# Patient Record
Sex: MALE | Race: WHITE | NOT HISPANIC OR LATINO | Employment: FULL TIME | URBAN - METROPOLITAN AREA
[De-identification: names, ages, dates, MRNs, and addresses within clinical notes are randomized per-mention and may not be internally consistent; named-entity substitution may affect disease eponyms.]

---

## 2023-03-27 LAB
ALANINE AMINOTRANSFERASE (SGPT) (U/L) IN SER/PLAS: 14 U/L (ref 10–52)
ALBUMIN (G/DL) IN SER/PLAS: 4.1 G/DL (ref 3.4–5)
ALKALINE PHOSPHATASE (U/L) IN SER/PLAS: 54 U/L (ref 33–136)
ANION GAP IN SER/PLAS: 13 MMOL/L (ref 10–20)
ASPARTATE AMINOTRANSFERASE (SGOT) (U/L) IN SER/PLAS: 17 U/L (ref 9–39)
BASOPHILS (10*3/UL) IN BLOOD BY AUTOMATED COUNT: 0.07 X10E9/L (ref 0–0.1)
BASOPHILS/100 LEUKOCYTES IN BLOOD BY AUTOMATED COUNT: 1 % (ref 0–2)
BILIRUBIN TOTAL (MG/DL) IN SER/PLAS: 0.7 MG/DL (ref 0–1.2)
CALCIUM (MG/DL) IN SER/PLAS: 9.1 MG/DL (ref 8.6–10.3)
CARBON DIOXIDE, TOTAL (MMOL/L) IN SER/PLAS: 24 MMOL/L (ref 21–32)
CHLORIDE (MMOL/L) IN SER/PLAS: 105 MMOL/L (ref 98–107)
CHOLESTEROL (MG/DL) IN SER/PLAS: 197 MG/DL (ref 0–199)
CHOLESTEROL IN HDL (MG/DL) IN SER/PLAS: 46.1 MG/DL
CHOLESTEROL/HDL RATIO: 4.3
CREATININE (MG/DL) IN SER/PLAS: 0.84 MG/DL (ref 0.5–1.3)
EOSINOPHILS (10*3/UL) IN BLOOD BY AUTOMATED COUNT: 0.7 X10E9/L (ref 0–0.7)
EOSINOPHILS/100 LEUKOCYTES IN BLOOD BY AUTOMATED COUNT: 10.3 % (ref 0–6)
ERYTHROCYTE DISTRIBUTION WIDTH (RATIO) BY AUTOMATED COUNT: 13.2 % (ref 11.5–14.5)
ERYTHROCYTE MEAN CORPUSCULAR HEMOGLOBIN CONCENTRATION (G/DL) BY AUTOMATED: 33.3 G/DL (ref 32–36)
ERYTHROCYTE MEAN CORPUSCULAR VOLUME (FL) BY AUTOMATED COUNT: 90 FL (ref 80–100)
ERYTHROCYTES (10*6/UL) IN BLOOD BY AUTOMATED COUNT: 5.24 X10E12/L (ref 4.5–5.9)
GFR MALE: >90 ML/MIN/1.73M2
GLUCOSE (MG/DL) IN SER/PLAS: 79 MG/DL (ref 74–99)
HEMATOCRIT (%) IN BLOOD BY AUTOMATED COUNT: 47.2 % (ref 41–52)
HEMOGLOBIN (G/DL) IN BLOOD: 15.7 G/DL (ref 13.5–17.5)
IMMATURE GRANULOCYTES/100 LEUKOCYTES IN BLOOD BY AUTOMATED COUNT: 0.1 % (ref 0–0.9)
LDL: 118 MG/DL (ref 0–99)
LEUKOCYTES (10*3/UL) IN BLOOD BY AUTOMATED COUNT: 6.8 X10E9/L (ref 4.4–11.3)
LYMPHOCYTES (10*3/UL) IN BLOOD BY AUTOMATED COUNT: 2.11 X10E9/L (ref 1.2–4.8)
LYMPHOCYTES/100 LEUKOCYTES IN BLOOD BY AUTOMATED COUNT: 30.9 % (ref 13–44)
MONOCYTES (10*3/UL) IN BLOOD BY AUTOMATED COUNT: 0.66 X10E9/L (ref 0.1–1)
MONOCYTES/100 LEUKOCYTES IN BLOOD BY AUTOMATED COUNT: 9.7 % (ref 2–10)
NEUTROPHILS (10*3/UL) IN BLOOD BY AUTOMATED COUNT: 3.27 X10E9/L (ref 1.2–7.7)
NEUTROPHILS/100 LEUKOCYTES IN BLOOD BY AUTOMATED COUNT: 48 % (ref 40–80)
PLATELETS (10*3/UL) IN BLOOD AUTOMATED COUNT: 232 X10E9/L (ref 150–450)
POTASSIUM (MMOL/L) IN SER/PLAS: 4.2 MMOL/L (ref 3.5–5.3)
PROSTATE SPECIFIC AG (NG/ML) IN SER/PLAS: 4.68 NG/ML (ref 0–4)
PROTEIN TOTAL: 6.8 G/DL (ref 6.4–8.2)
SODIUM (MMOL/L) IN SER/PLAS: 138 MMOL/L (ref 136–145)
TRIGLYCERIDE (MG/DL) IN SER/PLAS: 165 MG/DL (ref 0–149)
UREA NITROGEN (MG/DL) IN SER/PLAS: 18 MG/DL (ref 6–23)
VLDL: 33 MG/DL (ref 0–40)

## 2023-04-28 RX ORDER — GABAPENTIN 400 MG/1
1 CAPSULE ORAL 2 TIMES DAILY
COMMUNITY
Start: 2021-03-17 | End: 2023-05-01 | Stop reason: SDUPTHER

## 2023-04-28 RX ORDER — MELOXICAM 15 MG/1
1 TABLET ORAL DAILY
COMMUNITY
Start: 2020-06-09 | End: 2023-05-01 | Stop reason: SDUPTHER

## 2023-04-28 RX ORDER — VERAPAMIL HYDROCHLORIDE 180 MG/1
1 TABLET, FILM COATED, EXTENDED RELEASE ORAL DAILY
COMMUNITY
Start: 2020-03-25 | End: 2023-05-01 | Stop reason: SDUPTHER

## 2023-04-28 RX ORDER — ESOMEPRAZOLE MAGNESIUM 40 MG/1
40 CAPSULE, DELAYED RELEASE ORAL 2 TIMES DAILY
COMMUNITY
Start: 2022-07-06

## 2023-04-28 RX ORDER — PANCRELIPASE 36000; 180000; 114000 [USP'U]/1; [USP'U]/1; [USP'U]/1
2 CAPSULE, DELAYED RELEASE PELLETS ORAL
COMMUNITY
Start: 2021-08-27 | End: 2023-05-01 | Stop reason: SDUPTHER

## 2023-05-01 ENCOUNTER — OFFICE VISIT (OUTPATIENT)
Dept: PRIMARY CARE | Facility: CLINIC | Age: 63
End: 2023-05-01
Payer: COMMERCIAL

## 2023-05-01 VITALS
TEMPERATURE: 97.5 F | WEIGHT: 306.2 LBS | BODY MASS INDEX: 40.58 KG/M2 | HEART RATE: 78 BPM | HEIGHT: 73 IN | DIASTOLIC BLOOD PRESSURE: 68 MMHG | SYSTOLIC BLOOD PRESSURE: 128 MMHG | OXYGEN SATURATION: 93 %

## 2023-05-01 DIAGNOSIS — I10 ESSENTIAL HYPERTENSION: Primary | ICD-10-CM

## 2023-05-01 DIAGNOSIS — F41.1 GENERALIZED ANXIETY DISORDER: ICD-10-CM

## 2023-05-01 DIAGNOSIS — R97.20 PSA ELEVATION: ICD-10-CM

## 2023-05-01 DIAGNOSIS — G62.89 OTHER POLYNEUROPATHY: ICD-10-CM

## 2023-05-01 DIAGNOSIS — E66.01 CLASS 3 SEVERE OBESITY DUE TO EXCESS CALORIES WITHOUT SERIOUS COMORBIDITY WITH BODY MASS INDEX (BMI) OF 40.0 TO 44.9 IN ADULT (MULTI): ICD-10-CM

## 2023-05-01 DIAGNOSIS — K86.89 SECONDARY PANCREATIC INSUFFICIENCY (HHS-HCC): ICD-10-CM

## 2023-05-01 DIAGNOSIS — K21.9 CHRONIC GERD: ICD-10-CM

## 2023-05-01 DIAGNOSIS — M19.90 ARTHRITIS: ICD-10-CM

## 2023-05-01 PROBLEM — G89.29 BACK PAIN, CHRONIC: Status: ACTIVE | Noted: 2023-05-01

## 2023-05-01 PROBLEM — M10.9 GOUT: Status: ACTIVE | Noted: 2023-05-01

## 2023-05-01 PROBLEM — K57.30 DIVERTICULOSIS OF COLON: Status: ACTIVE | Noted: 2023-05-01

## 2023-05-01 PROBLEM — Z86.0100 HISTORY OF COLON POLYPS: Status: ACTIVE | Noted: 2023-05-01

## 2023-05-01 PROBLEM — G62.9 PERIPHERAL NEUROPATHY: Status: ACTIVE | Noted: 2023-05-01

## 2023-05-01 PROBLEM — E66.813 CLASS 3 SEVERE OBESITY DUE TO EXCESS CALORIES WITHOUT SERIOUS COMORBIDITY WITH BODY MASS INDEX (BMI) OF 40.0 TO 44.9 IN ADULT: Status: ACTIVE | Noted: 2023-05-01

## 2023-05-01 PROBLEM — Z86.010 HISTORY OF COLON POLYPS: Status: ACTIVE | Noted: 2023-05-01

## 2023-05-01 PROBLEM — M54.9 BACK PAIN, CHRONIC: Status: ACTIVE | Noted: 2023-05-01

## 2023-05-01 PROCEDURE — 3078F DIAST BP <80 MM HG: CPT | Performed by: INTERNAL MEDICINE

## 2023-05-01 PROCEDURE — 3074F SYST BP LT 130 MM HG: CPT | Performed by: INTERNAL MEDICINE

## 2023-05-01 PROCEDURE — 99214 OFFICE O/P EST MOD 30 MIN: CPT | Performed by: INTERNAL MEDICINE

## 2023-05-01 PROCEDURE — 3008F BODY MASS INDEX DOCD: CPT | Performed by: INTERNAL MEDICINE

## 2023-05-01 PROCEDURE — 1036F TOBACCO NON-USER: CPT | Performed by: INTERNAL MEDICINE

## 2023-05-01 RX ORDER — PANCRELIPASE 36000; 180000; 114000 [USP'U]/1; [USP'U]/1; [USP'U]/1
2 CAPSULE, DELAYED RELEASE PELLETS ORAL
Qty: 540 CAPSULE | Refills: 0 | Status: SHIPPED | OUTPATIENT
Start: 2023-05-01 | End: 2023-07-25 | Stop reason: SDUPTHER

## 2023-05-01 RX ORDER — GABAPENTIN 400 MG/1
400 CAPSULE ORAL 2 TIMES DAILY
Qty: 90 CAPSULE | Refills: 0 | Status: SHIPPED | OUTPATIENT
Start: 2023-05-01 | End: 2023-07-06 | Stop reason: SDUPTHER

## 2023-05-01 RX ORDER — MELOXICAM 15 MG/1
15 TABLET ORAL DAILY
Qty: 90 TABLET | Refills: 0 | Status: SHIPPED | OUTPATIENT
Start: 2023-05-01 | End: 2023-07-25 | Stop reason: SDUPTHER

## 2023-05-01 RX ORDER — VERAPAMIL HYDROCHLORIDE 180 MG/1
180 TABLET, FILM COATED, EXTENDED RELEASE ORAL DAILY
Qty: 90 TABLET | Refills: 0 | Status: SHIPPED | OUTPATIENT
Start: 2023-05-01 | End: 2023-07-25 | Stop reason: SDUPTHER

## 2023-05-01 ASSESSMENT — ENCOUNTER SYMPTOMS
ALLERGIC/IMMUNOLOGIC NEGATIVE: 1
NEUROLOGICAL NEGATIVE: 1
ENDOCRINE NEGATIVE: 1
CARDIOVASCULAR NEGATIVE: 1
GASTROINTESTINAL NEGATIVE: 1
PSYCHIATRIC NEGATIVE: 1
RESPIRATORY NEGATIVE: 1
MUSCULOSKELETAL NEGATIVE: 1
EYES NEGATIVE: 1
HEMATOLOGIC/LYMPHATIC NEGATIVE: 1
CONSTITUTIONAL NEGATIVE: 1

## 2023-05-01 NOTE — PROGRESS NOTES
"Subjective   Patient ID: Steven Burks is a 62 y.o. male who presents for 3 month follow up.  Patient presents in follow up regarding hypertension.  Blood pressure has been well controlled on current therapy.  No adverse effects of medication reported.  Patient denies chest pain, palpitations, shortness of breath, orthopnea, fatigue or edema.    Pancreatic insufficiency Sx are well compensated on current therapy without side effects other than he complains about the cost of the therapy.    He is currently not taking the medications for his anxiety due to side effects and states he has felt better since stopping these as well as stopping the use of cannabis.  He states that he is retiring from work and this has relieved much of his stress and Sx.        Review of Systems   Constitutional: Negative.    HENT: Negative.     Eyes: Negative.    Respiratory: Negative.     Cardiovascular: Negative.    Gastrointestinal: Negative.    Endocrine: Negative.    Genitourinary: Negative.    Musculoskeletal: Negative.    Skin: Negative.    Allergic/Immunologic: Negative.    Neurological: Negative.    Hematological: Negative.    Psychiatric/Behavioral: Negative.         Objective     Blood pressure 128/68, pulse 78, temperature 36.4 °C (97.5 °F), temperature source Temporal, height 1.854 m (6' 1\"), weight 139 kg (306 lb 3.2 oz), SpO2 93 %.   Physical Exam  Constitutional:       Appearance: Normal appearance.   Neck:      Vascular: No carotid bruit.   Cardiovascular:      Rate and Rhythm: Normal rate and regular rhythm.      Pulses: Normal pulses.      Heart sounds: Normal heart sounds. No murmur heard.  Pulmonary:      Effort: Pulmonary effort is normal.      Breath sounds: Normal breath sounds. No wheezing or rales.   Abdominal:      General: Abdomen is flat. There is no distension.      Palpations: Abdomen is soft.      Tenderness: There is no abdominal tenderness.   Musculoskeletal:         General: Normal range of motion.      " "Cervical back: Normal range of motion and neck supple.   Skin:     General: Skin is warm and dry.   Neurological:      General: No focal deficit present.      Mental Status: He is alert and oriented to person, place, and time.   Psychiatric:         Mood and Affect: Mood normal.         Behavior: Behavior normal.         Assessment/Plan   Problem List Items Addressed This Visit          Nervous    Peripheral neuropathy       Circulatory    Essential hypertension - Primary       Digestive    Chronic GERD    Secondary pancreatic insufficiency       Other    Generalized anxiety disorder     BP well controlled on current therapy.  Will continue present therapy and follow.  Pancreatic insufficiency Sx well compensated on current therapy.  Will continue present therapy and follow.  Arthritis Sx overall well compensated on current therapy though he states he requires additional ibuprofen especially since he stopped cannabis use.  Will continue present therapy and follow.   Neuropathy Sx overall well compensated on current therapy.  Will continue present therapy and follow.  He does state that he feels he should qualify for disability due to his arthritis, neuropathy and anxiety.  Will try to have a work evaluation done through PT dept.  He states that with his upcoming halfway, his anxiety has been much better because \"I can see an end\" to the stress that is related to his work.  PSA remains stable at only a little over 4.0.    Follow up in 3 months  Lab to be drawn 1 week prior to next office visit.        "

## 2023-05-08 ENCOUNTER — TELEPHONE (OUTPATIENT)
Dept: PRIMARY CARE | Facility: CLINIC | Age: 63
End: 2023-05-08
Payer: COMMERCIAL

## 2023-07-06 ENCOUNTER — TELEPHONE (OUTPATIENT)
Dept: PRIMARY CARE | Facility: CLINIC | Age: 63
End: 2023-07-06
Payer: COMMERCIAL

## 2023-07-06 DIAGNOSIS — G62.89 OTHER POLYNEUROPATHY: ICD-10-CM

## 2023-07-06 RX ORDER — GABAPENTIN 400 MG/1
400 CAPSULE ORAL 2 TIMES DAILY
Qty: 60 CAPSULE | Refills: 0 | Status: SHIPPED | OUTPATIENT
Start: 2023-07-06 | End: 2023-07-25 | Stop reason: SDUPTHER

## 2023-07-06 NOTE — TELEPHONE ENCOUNTER
Rx Refill Request Telephone Encounter    Name:  Steven Burks  :  [id number]  Medication Name:  Gabepentin   Dose : 300 mg   Route : oral  Frequency : twice a day  180    Specific Pharmacy location:  [address]  Date of last appointment:  May 1  Date of next appointment:    Best number to reach patient:  [phone]

## 2023-07-24 DIAGNOSIS — K86.89 SECONDARY PANCREATIC INSUFFICIENCY (HHS-HCC): ICD-10-CM

## 2023-07-24 DIAGNOSIS — M19.90 ARTHRITIS: ICD-10-CM

## 2023-07-24 DIAGNOSIS — G62.89 OTHER POLYNEUROPATHY: ICD-10-CM

## 2023-07-24 DIAGNOSIS — I10 ESSENTIAL HYPERTENSION: ICD-10-CM

## 2023-07-24 NOTE — TELEPHONE ENCOUNTER
The patient stated that you are correct the insurance company did not dictate the schedule. The patient stated that he wants to be seen every 6 months and is asking for a refill of his medications.          The patient stated if you are not able to see him every 6 months that he would need to find a new provider and he also went on and stated that you work for him.

## 2023-07-24 NOTE — TELEPHONE ENCOUNTER
This is a patient of Dr. Thompson.    The patient has pushed his appointment out until October because he was told by his insurance company that he only needs to be seen every 6 months.  The patient was scheduled for 07/31/2023 and he is asking for refills on all of his medications.          The patient new appointment is scheduled for 10/02/2023 and his last appointment was on  05/01/2023.

## 2023-07-25 RX ORDER — GABAPENTIN 400 MG/1
400 CAPSULE ORAL 2 TIMES DAILY
Qty: 60 CAPSULE | Refills: 0 | Status: SHIPPED | OUTPATIENT
Start: 2023-07-25 | End: 2023-08-29 | Stop reason: SDUPTHER

## 2023-07-25 RX ORDER — VERAPAMIL HYDROCHLORIDE 180 MG/1
180 TABLET, FILM COATED, EXTENDED RELEASE ORAL DAILY
Qty: 30 TABLET | Refills: 0 | Status: SHIPPED | OUTPATIENT
Start: 2023-07-25 | End: 2023-08-29 | Stop reason: SDUPTHER

## 2023-07-25 RX ORDER — PANCRELIPASE 36000; 180000; 114000 [USP'U]/1; [USP'U]/1; [USP'U]/1
2 CAPSULE, DELAYED RELEASE PELLETS ORAL
Qty: 180 CAPSULE | Refills: 0 | Status: SHIPPED | OUTPATIENT
Start: 2023-07-25 | End: 2023-08-29 | Stop reason: SDUPTHER

## 2023-07-25 RX ORDER — MELOXICAM 15 MG/1
15 TABLET ORAL DAILY
Qty: 30 TABLET | Refills: 0 | Status: SHIPPED | OUTPATIENT
Start: 2023-07-25 | End: 2023-08-29 | Stop reason: SDUPTHER

## 2023-07-31 ENCOUNTER — APPOINTMENT (OUTPATIENT)
Dept: PRIMARY CARE | Facility: CLINIC | Age: 63
End: 2023-07-31
Payer: COMMERCIAL

## 2023-08-29 ENCOUNTER — OFFICE VISIT (OUTPATIENT)
Dept: PRIMARY CARE | Facility: CLINIC | Age: 63
End: 2023-08-29
Payer: COMMERCIAL

## 2023-08-29 VITALS
OXYGEN SATURATION: 96 % | DIASTOLIC BLOOD PRESSURE: 72 MMHG | BODY MASS INDEX: 40.43 KG/M2 | SYSTOLIC BLOOD PRESSURE: 124 MMHG | HEART RATE: 80 BPM | WEIGHT: 315 LBS | TEMPERATURE: 97.4 F | HEIGHT: 74 IN

## 2023-08-29 DIAGNOSIS — K86.89 SECONDARY PANCREATIC INSUFFICIENCY (HHS-HCC): ICD-10-CM

## 2023-08-29 DIAGNOSIS — G62.89 OTHER POLYNEUROPATHY: ICD-10-CM

## 2023-08-29 DIAGNOSIS — M19.90 ARTHRITIS: ICD-10-CM

## 2023-08-29 DIAGNOSIS — R97.20 PSA ELEVATION: ICD-10-CM

## 2023-08-29 DIAGNOSIS — I10 ESSENTIAL HYPERTENSION: Primary | ICD-10-CM

## 2023-08-29 DIAGNOSIS — E66.01 CLASS 3 SEVERE OBESITY DUE TO EXCESS CALORIES WITH SERIOUS COMORBIDITY AND BODY MASS INDEX (BMI) OF 40.0 TO 44.9 IN ADULT (MULTI): ICD-10-CM

## 2023-08-29 PROCEDURE — 3078F DIAST BP <80 MM HG: CPT | Performed by: INTERNAL MEDICINE

## 2023-08-29 PROCEDURE — 3008F BODY MASS INDEX DOCD: CPT | Performed by: INTERNAL MEDICINE

## 2023-08-29 PROCEDURE — 3074F SYST BP LT 130 MM HG: CPT | Performed by: INTERNAL MEDICINE

## 2023-08-29 PROCEDURE — 1036F TOBACCO NON-USER: CPT | Performed by: INTERNAL MEDICINE

## 2023-08-29 PROCEDURE — 99214 OFFICE O/P EST MOD 30 MIN: CPT | Performed by: INTERNAL MEDICINE

## 2023-08-29 RX ORDER — VERAPAMIL HYDROCHLORIDE 180 MG/1
180 TABLET, FILM COATED, EXTENDED RELEASE ORAL DAILY
Qty: 90 TABLET | Refills: 1 | Status: SHIPPED | OUTPATIENT
Start: 2023-08-29 | End: 2024-02-26 | Stop reason: SDUPTHER

## 2023-08-29 RX ORDER — MELOXICAM 15 MG/1
15 TABLET ORAL DAILY
Qty: 90 TABLET | Refills: 1 | Status: SHIPPED | OUTPATIENT
Start: 2023-08-29 | End: 2024-02-26 | Stop reason: SDUPTHER

## 2023-08-29 RX ORDER — PANCRELIPASE 36000; 180000; 114000 [USP'U]/1; [USP'U]/1; [USP'U]/1
2 CAPSULE, DELAYED RELEASE PELLETS ORAL
Qty: 540 CAPSULE | Refills: 1 | Status: SHIPPED | OUTPATIENT
Start: 2023-08-29 | End: 2024-02-26 | Stop reason: SDUPTHER

## 2023-08-29 RX ORDER — GABAPENTIN 400 MG/1
400 CAPSULE ORAL 2 TIMES DAILY
Qty: 180 CAPSULE | Refills: 1 | Status: SHIPPED | OUTPATIENT
Start: 2023-08-29 | End: 2024-02-26 | Stop reason: SDUPTHER

## 2023-08-29 RX ORDER — GABAPENTIN 400 MG/1
400 CAPSULE ORAL 2 TIMES DAILY
Qty: 180 CAPSULE | Refills: 0 | Status: CANCELLED | OUTPATIENT
Start: 2023-08-29 | End: 2023-11-27

## 2023-08-29 RX ORDER — MELOXICAM 15 MG/1
15 TABLET ORAL DAILY
Qty: 90 TABLET | Refills: 0 | Status: CANCELLED | OUTPATIENT
Start: 2023-08-29 | End: 2023-11-27

## 2023-08-29 RX ORDER — VERAPAMIL HYDROCHLORIDE 180 MG/1
180 TABLET, FILM COATED, EXTENDED RELEASE ORAL DAILY
Qty: 90 TABLET | Refills: 0 | Status: CANCELLED | OUTPATIENT
Start: 2023-08-29 | End: 2023-11-27

## 2023-08-29 RX ORDER — PANCRELIPASE 36000; 180000; 114000 [USP'U]/1; [USP'U]/1; [USP'U]/1
2 CAPSULE, DELAYED RELEASE PELLETS ORAL
Qty: 540 CAPSULE | Refills: 0 | Status: CANCELLED | OUTPATIENT
Start: 2023-08-29 | End: 2023-11-27

## 2023-08-29 ASSESSMENT — ENCOUNTER SYMPTOMS
ENDOCRINE NEGATIVE: 1
CONSTITUTIONAL NEGATIVE: 1
HEMATOLOGIC/LYMPHATIC NEGATIVE: 1
NEUROLOGICAL NEGATIVE: 1
ALLERGIC/IMMUNOLOGIC NEGATIVE: 1
PSYCHIATRIC NEGATIVE: 1
MUSCULOSKELETAL NEGATIVE: 1
EYES NEGATIVE: 1
GASTROINTESTINAL NEGATIVE: 1
CARDIOVASCULAR NEGATIVE: 1
RESPIRATORY NEGATIVE: 1

## 2023-08-29 NOTE — PROGRESS NOTES
"Subjective   Patient ID: Steven Burks is a 63 y.o. male who presents for 3 month follow up .  Patient presents in follow up regarding hypertension.  Blood pressure has been well controlled on current therapy.  No adverse effects of medication reported.  Patient denies chest pain, palpitations, shortness of breath, orthopnea, fatigue or edema.    Pancreatic insufficiency Sx are well compensated on current therapy without side effects other than he complains about the cost of the therapy.    He is currently not taking the medications for his anxiety due to side effects and states he has felt better since stopping these as well as stopping the use of cannabis.  He states that he is retiring from work and this has relieved much of his stress and Sx.        Review of Systems   Constitutional: Negative.    HENT: Negative.     Eyes: Negative.    Respiratory: Negative.     Cardiovascular: Negative.    Gastrointestinal: Negative.    Endocrine: Negative.    Genitourinary: Negative.    Musculoskeletal: Negative.    Skin: Negative.    Allergic/Immunologic: Negative.    Neurological: Negative.    Hematological: Negative.    Psychiatric/Behavioral: Negative.         Objective     Blood pressure 124/72, pulse 80, temperature 36.3 °C (97.4 °F), temperature source Temporal, height 1.867 m (6' 1.5\"), weight (!) 155 kg (341 lb 12.8 oz), SpO2 96 %.   Physical Exam  Constitutional:       Appearance: Normal appearance.   Neck:      Vascular: No carotid bruit.   Cardiovascular:      Rate and Rhythm: Normal rate and regular rhythm.      Pulses: Normal pulses.      Heart sounds: Normal heart sounds. No murmur heard.  Pulmonary:      Effort: Pulmonary effort is normal.      Breath sounds: Normal breath sounds. No wheezing or rales.   Abdominal:      General: Abdomen is flat. There is no distension.      Palpations: Abdomen is soft.      Tenderness: There is no abdominal tenderness.   Musculoskeletal:         General: Normal range of motion. "      Cervical back: Normal range of motion and neck supple.   Skin:     General: Skin is warm and dry.   Neurological:      General: No focal deficit present.      Mental Status: He is alert and oriented to person, place, and time.   Psychiatric:         Mood and Affect: Mood normal.         Behavior: Behavior normal.         Assessment/Plan   Problem List Items Addressed This Visit       Arthritis    Relevant Medications    meloxicam (Mobic) 15 mg tablet    Essential hypertension - Primary    Relevant Medications    verapamil SR (Calan-SR) 180 mg ER tablet    Other Relevant Orders    Comprehensive Metabolic Panel    CBC and Auto Differential    Peripheral neuropathy    Relevant Medications    gabapentin (Neurontin) 400 mg capsule    Secondary pancreatic insufficiency    Relevant Medications    pancrelipase, Lip-Prot-Amyl, (Creon) 36,000-114,000- 180,000 unit capsule,delayed release(DR/EC) capsule    Other Relevant Orders    Comprehensive Metabolic Panel    Lipid Panel    PSA elevation    Relevant Orders    Prostate Specific Antigen     Other Visit Diagnoses       Class 3 severe obesity due to excess calories with serious comorbidity and body mass index (BMI) of 40.0 to 44.9 in adult (CMS/[facility])              BP well controlled on current therapy.  Will continue present therapy and follow.  Pancreatic insufficiency Sx well compensated on current therapy.  Will continue present therapy and follow.  Arthritis Sx overall well compensated on current therapy though he states he requires additional ibuprofen especially since he stopped cannabis use.  Will continue present therapy and follow.   Neuropathy Sx overall well compensated on current therapy.  Will continue present therapy and follow.  He does state that he feels he should qualify for disability due to his arthritis, neuropathy and anxiety.  Asked to have a work evaluation done through PT department at last visit but he did not follow through with this.  He states  that he has retired and is feeling much better overall with less stress and anxiety.  It is noted that he has gained 35# over the last 4 months.    Pt. advised on improving dietary choices and portion control as well as increasing exercise to promote weight loss.       Follow up in 3 months  Lab to be drawn 1 week prior to next office visit.

## 2023-10-02 ENCOUNTER — APPOINTMENT (OUTPATIENT)
Dept: PRIMARY CARE | Facility: CLINIC | Age: 63
End: 2023-10-02
Payer: COMMERCIAL

## 2023-12-28 ENCOUNTER — HOSPITAL ENCOUNTER (OUTPATIENT)
Dept: RADIOLOGY | Facility: HOSPITAL | Age: 63
Discharge: HOME | End: 2023-12-28
Payer: COMMERCIAL

## 2023-12-28 DIAGNOSIS — R05.9 COUGH, UNSPECIFIED: ICD-10-CM

## 2023-12-28 PROCEDURE — 71046 X-RAY EXAM CHEST 2 VIEWS: CPT | Performed by: RADIOLOGY

## 2023-12-28 PROCEDURE — 71046 X-RAY EXAM CHEST 2 VIEWS: CPT

## 2024-01-26 ENCOUNTER — TELEPHONE (OUTPATIENT)
Dept: PRIMARY CARE | Facility: CLINIC | Age: 64
End: 2024-01-26
Payer: COMMERCIAL

## 2024-01-26 ENCOUNTER — TELEPHONE (OUTPATIENT)
Dept: PRIMARY CARE | Facility: CLINIC | Age: 64
End: 2024-01-26

## 2024-01-26 NOTE — TELEPHONE ENCOUNTER
Tried calling patient to get more information about which medications he need renewed. The patient does not have voice mail

## 2024-01-30 NOTE — TELEPHONE ENCOUNTER
Henrik,  Patient needs the Creon ordered.     [address]    Trying to push it through but it is not covered. They have sent us a prior auth

## 2024-02-19 ENCOUNTER — LAB (OUTPATIENT)
Dept: LAB | Facility: LAB | Age: 64
End: 2024-02-19
Payer: COMMERCIAL

## 2024-02-19 DIAGNOSIS — I10 ESSENTIAL HYPERTENSION: ICD-10-CM

## 2024-02-19 DIAGNOSIS — R97.20 PSA ELEVATION: ICD-10-CM

## 2024-02-19 DIAGNOSIS — K86.89 SECONDARY PANCREATIC INSUFFICIENCY (HHS-HCC): ICD-10-CM

## 2024-02-19 LAB
ALBUMIN SERPL BCP-MCNC: 3.9 G/DL (ref 3.4–5)
ALP SERPL-CCNC: 59 U/L (ref 33–136)
ALT SERPL W P-5'-P-CCNC: 20 U/L (ref 10–52)
ANION GAP SERPL CALC-SCNC: 15 MMOL/L (ref 10–20)
AST SERPL W P-5'-P-CCNC: 22 U/L (ref 9–39)
BASOPHILS # BLD AUTO: 0.09 X10*3/UL (ref 0–0.1)
BASOPHILS NFR BLD AUTO: 1.2 %
BILIRUB SERPL-MCNC: 0.6 MG/DL (ref 0–1.2)
BUN SERPL-MCNC: 17 MG/DL (ref 6–23)
CALCIUM SERPL-MCNC: 8.9 MG/DL (ref 8.6–10.3)
CHLORIDE SERPL-SCNC: 105 MMOL/L (ref 98–107)
CHOLEST SERPL-MCNC: 222 MG/DL (ref 0–199)
CHOLESTEROL/HDL RATIO: 5.3
CO2 SERPL-SCNC: 21 MMOL/L (ref 21–32)
CREAT SERPL-MCNC: 0.95 MG/DL (ref 0.5–1.3)
EGFRCR SERPLBLD CKD-EPI 2021: 90 ML/MIN/1.73M*2
EOSINOPHIL # BLD AUTO: 0.5 X10*3/UL (ref 0–0.7)
EOSINOPHIL NFR BLD AUTO: 6.5 %
ERYTHROCYTE [DISTWIDTH] IN BLOOD BY AUTOMATED COUNT: 13.3 % (ref 11.5–14.5)
GLUCOSE SERPL-MCNC: 86 MG/DL (ref 74–99)
HCT VFR BLD AUTO: 55.2 % (ref 41–52)
HDLC SERPL-MCNC: 41.7 MG/DL
HGB BLD-MCNC: 16.8 G/DL (ref 13.5–17.5)
IMM GRANULOCYTES # BLD AUTO: 0.05 X10*3/UL (ref 0–0.7)
IMM GRANULOCYTES NFR BLD AUTO: 0.6 % (ref 0–0.9)
LDLC SERPL CALC-MCNC: 139 MG/DL
LYMPHOCYTES # BLD AUTO: 2.09 X10*3/UL (ref 1.2–4.8)
LYMPHOCYTES NFR BLD AUTO: 27.1 %
MCH RBC QN AUTO: 30.9 PG (ref 26–34)
MCHC RBC AUTO-ENTMCNC: 30.4 G/DL (ref 32–36)
MCV RBC AUTO: 102 FL (ref 80–100)
MONOCYTES # BLD AUTO: 0.69 X10*3/UL (ref 0.1–1)
MONOCYTES NFR BLD AUTO: 9 %
NEUTROPHILS # BLD AUTO: 4.28 X10*3/UL (ref 1.2–7.7)
NEUTROPHILS NFR BLD AUTO: 55.6 %
NON HDL CHOLESTEROL: 180 MG/DL (ref 0–149)
NRBC BLD-RTO: 0 /100 WBCS (ref 0–0)
PLATELET # BLD AUTO: 221 X10*3/UL (ref 150–450)
POTASSIUM SERPL-SCNC: 4.5 MMOL/L (ref 3.5–5.3)
PROT SERPL-MCNC: 6.9 G/DL (ref 6.4–8.2)
PSA SERPL-MCNC: 6.18 NG/ML
RBC # BLD AUTO: 5.43 X10*6/UL (ref 4.5–5.9)
SODIUM SERPL-SCNC: 136 MMOL/L (ref 136–145)
TRIGL SERPL-MCNC: 207 MG/DL (ref 0–149)
VLDL: 41 MG/DL (ref 0–40)
WBC # BLD AUTO: 7.7 X10*3/UL (ref 4.4–11.3)

## 2024-02-19 PROCEDURE — 80061 LIPID PANEL: CPT

## 2024-02-19 PROCEDURE — 84153 ASSAY OF PSA TOTAL: CPT

## 2024-02-19 PROCEDURE — 85025 COMPLETE CBC W/AUTO DIFF WBC: CPT

## 2024-02-19 PROCEDURE — 80053 COMPREHEN METABOLIC PANEL: CPT

## 2024-02-26 ENCOUNTER — HOSPITAL ENCOUNTER (OUTPATIENT)
Dept: RADIOLOGY | Facility: CLINIC | Age: 64
Discharge: HOME | End: 2024-02-26
Payer: COMMERCIAL

## 2024-02-26 ENCOUNTER — OFFICE VISIT (OUTPATIENT)
Dept: PRIMARY CARE | Facility: CLINIC | Age: 64
End: 2024-02-26
Payer: COMMERCIAL

## 2024-02-26 VITALS
SYSTOLIC BLOOD PRESSURE: 130 MMHG | OXYGEN SATURATION: 97 % | WEIGHT: 315 LBS | TEMPERATURE: 97 F | DIASTOLIC BLOOD PRESSURE: 80 MMHG | HEART RATE: 69 BPM | BODY MASS INDEX: 45.47 KG/M2

## 2024-02-26 DIAGNOSIS — E66.01 CLASS 3 SEVERE OBESITY DUE TO EXCESS CALORIES WITH SERIOUS COMORBIDITY AND BODY MASS INDEX (BMI) OF 45.0 TO 49.9 IN ADULT (MULTI): ICD-10-CM

## 2024-02-26 DIAGNOSIS — R97.20 PSA ELEVATION: ICD-10-CM

## 2024-02-26 DIAGNOSIS — M19.90 ARTHRITIS: ICD-10-CM

## 2024-02-26 DIAGNOSIS — G89.29 CHRONIC PAIN OF LEFT KNEE: ICD-10-CM

## 2024-02-26 DIAGNOSIS — K86.89 SECONDARY PANCREATIC INSUFFICIENCY (HHS-HCC): ICD-10-CM

## 2024-02-26 DIAGNOSIS — M25.562 CHRONIC PAIN OF LEFT KNEE: ICD-10-CM

## 2024-02-26 DIAGNOSIS — K21.9 CHRONIC GERD: ICD-10-CM

## 2024-02-26 DIAGNOSIS — I10 ESSENTIAL HYPERTENSION: Primary | ICD-10-CM

## 2024-02-26 DIAGNOSIS — G62.89 OTHER POLYNEUROPATHY: ICD-10-CM

## 2024-02-26 PROCEDURE — 3075F SYST BP GE 130 - 139MM HG: CPT | Performed by: INTERNAL MEDICINE

## 2024-02-26 PROCEDURE — 73562 X-RAY EXAM OF KNEE 3: CPT | Mod: LT

## 2024-02-26 PROCEDURE — 99214 OFFICE O/P EST MOD 30 MIN: CPT | Performed by: INTERNAL MEDICINE

## 2024-02-26 PROCEDURE — 3079F DIAST BP 80-89 MM HG: CPT | Performed by: INTERNAL MEDICINE

## 2024-02-26 PROCEDURE — 73562 X-RAY EXAM OF KNEE 3: CPT | Mod: LEFT SIDE | Performed by: RADIOLOGY

## 2024-02-26 PROCEDURE — 3008F BODY MASS INDEX DOCD: CPT | Performed by: INTERNAL MEDICINE

## 2024-02-26 PROCEDURE — 1036F TOBACCO NON-USER: CPT | Performed by: INTERNAL MEDICINE

## 2024-02-26 RX ORDER — MELOXICAM 15 MG/1
15 TABLET ORAL DAILY
Qty: 90 TABLET | Refills: 1 | Status: SHIPPED | OUTPATIENT
Start: 2024-02-26

## 2024-02-26 RX ORDER — VERAPAMIL HYDROCHLORIDE 180 MG/1
180 TABLET, FILM COATED, EXTENDED RELEASE ORAL DAILY
Qty: 90 TABLET | Refills: 1 | Status: SHIPPED | OUTPATIENT
Start: 2024-02-26

## 2024-02-26 RX ORDER — GABAPENTIN 400 MG/1
400 CAPSULE ORAL 2 TIMES DAILY
Qty: 180 CAPSULE | Refills: 1 | Status: SHIPPED | OUTPATIENT
Start: 2024-02-26

## 2024-02-26 RX ORDER — PANCRELIPASE 36000; 180000; 114000 [USP'U]/1; [USP'U]/1; [USP'U]/1
2 CAPSULE, DELAYED RELEASE PELLETS ORAL
Qty: 540 CAPSULE | Refills: 1 | Status: SHIPPED | OUTPATIENT
Start: 2024-02-26

## 2024-02-26 ASSESSMENT — ENCOUNTER SYMPTOMS
MUSCULOSKELETAL NEGATIVE: 1
ENDOCRINE NEGATIVE: 1
CARDIOVASCULAR NEGATIVE: 1
PSYCHIATRIC NEGATIVE: 1
HEMATOLOGIC/LYMPHATIC NEGATIVE: 1
CONSTITUTIONAL NEGATIVE: 1
EYES NEGATIVE: 1
RESPIRATORY NEGATIVE: 1
NEUROLOGICAL NEGATIVE: 1
ALLERGIC/IMMUNOLOGIC NEGATIVE: 1
GASTROINTESTINAL NEGATIVE: 1

## 2024-02-26 NOTE — PROGRESS NOTES
Subjective   Patient ID: Steven Burks is a 63 y.o. male who presents for 6 month follow up .  Patient presents in follow up regarding hypertension.  Blood pressure has been well controlled on current therapy.  No adverse effects of medication reported.  Patient denies chest pain, palpitations, shortness of breath, orthopnea, fatigue or edema.    Pancreatic insufficiency Sx are well compensated on current therapy without side effects other than he complains about the cost of the therapy.    He is currently not taking the medications for his anxiety due to side effects and states he has felt better since stopping these as well as stopping the use of cannabis.  He states that he is retiring from work and this has relieved much of his stress and Sx.        Review of Systems   Constitutional: Negative.    HENT: Negative.     Eyes: Negative.    Respiratory: Negative.     Cardiovascular: Negative.    Gastrointestinal: Negative.    Endocrine: Negative.    Genitourinary: Negative.    Musculoskeletal: Negative.    Skin: Negative.    Allergic/Immunologic: Negative.    Neurological: Negative.    Hematological: Negative.    Psychiatric/Behavioral: Negative.         Objective     Blood pressure 130/80, pulse 69, temperature 36.1 °C (97 °F), temperature source Temporal, weight (!) 158 kg (349 lb 6.4 oz), SpO2 97 %.   Physical Exam  Constitutional:       Appearance: Normal appearance.   Neck:      Vascular: No carotid bruit.   Cardiovascular:      Rate and Rhythm: Normal rate and regular rhythm.      Pulses: Normal pulses.      Heart sounds: Normal heart sounds. No murmur heard.  Pulmonary:      Effort: Pulmonary effort is normal.      Breath sounds: Normal breath sounds. No wheezing or rales.   Abdominal:      General: Abdomen is flat. There is no distension.      Palpations: Abdomen is soft.      Tenderness: There is no abdominal tenderness.   Musculoskeletal:         General: Normal range of motion.      Cervical back: Normal  range of motion and neck supple.   Skin:     General: Skin is warm and dry.   Neurological:      General: No focal deficit present.      Mental Status: He is alert and oriented to person, place, and time.   Psychiatric:         Mood and Affect: Mood normal.         Behavior: Behavior normal.         Assessment/Plan   Problem List Items Addressed This Visit       Arthritis    Relevant Medications    meloxicam (Mobic) 15 mg tablet    Chronic GERD    Relevant Orders    CBC and Auto Differential    Essential hypertension - Primary    Relevant Medications    verapamil SR (Calan-SR) 180 mg ER tablet    Other Relevant Orders    Comprehensive Metabolic Panel    CBC and Auto Differential    Peripheral neuropathy    Relevant Medications    gabapentin (Neurontin) 400 mg capsule    Secondary pancreatic insufficiency    Relevant Medications    pancrelipase, Lip-Prot-Amyl, (Creon) 36,000-114,000- 180,000 unit capsule,delayed release(DR/EC) capsule    Other Relevant Orders    Lipid Panel    Comprehensive Metabolic Panel    PSA elevation    Relevant Orders    Referral to Urology    Prostate Specific Antigen     Other Visit Diagnoses       Class 3 severe obesity due to excess calories with serious comorbidity and body mass index (BMI) of 45.0 to 49.9 in adult (CMS/[facility])        Chronic pain of left knee        Relevant Orders    XR knee left 3 views    Referral to Orthopaedic Surgery          BP well controlled on current therapy.  Will continue present therapy and follow.  Pancreatic insufficiency Sx well compensated on current therapy.  Will continue present therapy and follow.  Arthritis Sx overall well compensated on current therapy though he states he requires additional ibuprofen especially since he stopped cannabis use.  Will continue present therapy and follow.   GERD Sx well compensated on current therapy.  Will continue present therapy and follow clinically.   Neuropathy Sx overall well compensated on current therapy.  Will  continue present therapy and follow.  He states that he has retired and is feeling much better overall with less stress and anxiety.  It is noted that he has gained 43# over the last 9 months, though he has only gained 8 of that over the last 6 months.    Pt. advised on improving dietary choices and portion control as well as increasing exercise to promote weight loss.   He has a new complaint of left medial knee pain with throbbing.  He states this has been present for quite a while and is not improving.  It does affect ambulation, he states.  Will x-ray and refer to Ortho.  Lab review is remarkable for a rise in PSA to >6.  Will refer to Urology for eval.    > 30 minutes was spent with the patient today, the majority of which was spent on review of history and medications as well as counselling on care plan and/or coordination of care.       Follow up in 3 months  Lab to be drawn 1 week prior to next office visit.

## 2024-03-05 ENCOUNTER — OFFICE VISIT (OUTPATIENT)
Dept: ORTHOPEDIC SURGERY | Facility: CLINIC | Age: 64
End: 2024-03-05
Payer: COMMERCIAL

## 2024-03-05 VITALS — BODY MASS INDEX: 40.43 KG/M2 | WEIGHT: 315 LBS | HEIGHT: 74 IN

## 2024-03-05 DIAGNOSIS — G89.29 CHRONIC PAIN OF LEFT KNEE: ICD-10-CM

## 2024-03-05 DIAGNOSIS — M25.562 CHRONIC PAIN OF LEFT KNEE: ICD-10-CM

## 2024-03-05 DIAGNOSIS — M17.12 PRIMARY LOCALIZED OSTEOARTHRITIS OF LEFT KNEE: ICD-10-CM

## 2024-03-05 PROCEDURE — 3008F BODY MASS INDEX DOCD: CPT | Performed by: SPECIALIST

## 2024-03-05 PROCEDURE — 1036F TOBACCO NON-USER: CPT | Performed by: SPECIALIST

## 2024-03-05 PROCEDURE — 99204 OFFICE O/P NEW MOD 45 MIN: CPT | Performed by: SPECIALIST

## 2024-03-05 NOTE — PROGRESS NOTES
Left medial knee pain for about 5 months, no injury.   Pain is constant, has aching, sharp pain and burning.   States he can not straighten it most of the time.   Not taking OTC meds for pain.     No prior surgeries.     Xrays done.     EXAM:    GENERAL: A/Ox3, NAD. Appears healthy, well nourished  SKIN: no erythema, rashes, or ecchymoses     MUSCULOSKELETAL:  Laterality: Left knee Exam  - Alignment: partially correctible varus deformity  - ROM: Full with moderate pain and crepitus  - Effusion: none  - Strength: knee extension and flexion 5/5, EHL/PF/DF motor intact  - Palpation: TTP mostly along medial joint line  - Stability: Anterior/Posterior stable, varus/valgus stable. Mild pseudo valgus instability  - Gait: mild antalgic  - Hip Exam: flexion to 100+ degrees, full extension, internal/external rotation adequate, and no pain with log roll  - Special Tests: none performed    NEUROVASCULAR:  - Neurovascular Status: sensation intact to light touch distally  - Capillary refill brisk at extremities, Bilateral dorsalis pedis pulse 2+    RADIOGRAPHS moderate osteoarthritis mostly medial compartment    ASSESSMENT left knee osteoarthritis    PLAN tolerated Kenalog injection.  Gave him a home range of motion strengthening program.  Follow-up if no improvement  L Inj/Asp: L knee on 3/6/2024 1:16 PM  Indications: pain  Details: 22 G needle, anterolateral approach  Medications: 1 mL lidocaine 10 mg/mL (1 %); 40 mg triamcinolone acetonide 40 mg/mL  Outcome: tolerated well, no immediate complications  Procedure, treatment alternatives, risks and benefits explained, specific risks discussed. Consent was given by the patient. Immediately prior to procedure a time out was called to verify the correct patient, procedure, equipment, support staff and site/side marked as required. Patient was prepped and draped in the usual sterile fashion.           This note was dictated using speech recognition software and was not corrected for  spelling or grammatical errors

## 2024-03-06 PROCEDURE — 20610 DRAIN/INJ JOINT/BURSA W/O US: CPT | Performed by: SPECIALIST

## 2024-03-06 RX ORDER — TRIAMCINOLONE ACETONIDE 40 MG/ML
40 INJECTION, SUSPENSION INTRA-ARTICULAR; INTRAMUSCULAR
Status: COMPLETED | OUTPATIENT
Start: 2024-03-06 | End: 2024-03-06

## 2024-03-06 RX ORDER — LIDOCAINE HYDROCHLORIDE 10 MG/ML
1 INJECTION INFILTRATION; PERINEURAL
Status: COMPLETED | OUTPATIENT
Start: 2024-03-06 | End: 2024-03-06

## 2024-03-06 RX ADMIN — LIDOCAINE HYDROCHLORIDE 1 ML: 10 INJECTION INFILTRATION; PERINEURAL at 13:16

## 2024-03-06 RX ADMIN — TRIAMCINOLONE ACETONIDE 40 MG: 40 INJECTION, SUSPENSION INTRA-ARTICULAR; INTRAMUSCULAR at 13:16

## 2024-03-15 ENCOUNTER — APPOINTMENT (OUTPATIENT)
Dept: PHYSICAL THERAPY | Facility: HOSPITAL | Age: 64
End: 2024-03-15
Payer: COMMERCIAL

## 2024-03-22 ENCOUNTER — OFFICE VISIT (OUTPATIENT)
Dept: UROLOGY | Facility: CLINIC | Age: 64
End: 2024-03-22
Payer: COMMERCIAL

## 2024-03-22 DIAGNOSIS — N40.1 BENIGN PROSTATIC HYPERPLASIA WITH LOWER URINARY TRACT SYMPTOMS, SYMPTOM DETAILS UNSPECIFIED: Primary | ICD-10-CM

## 2024-03-22 DIAGNOSIS — N52.8 OTHER MALE ERECTILE DYSFUNCTION: ICD-10-CM

## 2024-03-22 DIAGNOSIS — R97.20 PSA ELEVATION: ICD-10-CM

## 2024-03-22 LAB
POC BILIRUBIN, URINE: NEGATIVE
POC BLOOD, URINE: NEGATIVE
POC GLUCOSE, URINE: NEGATIVE MG/DL
POC KETONES, URINE: ABNORMAL MG/DL
POC LEUKOCYTES, URINE: NEGATIVE
POC NITRITE,URINE: NEGATIVE
POC PH, URINE: 6 PH
POC PROTEIN, URINE: NEGATIVE MG/DL
POC SPECIFIC GRAVITY, URINE: 1.02
POC UROBILINOGEN, URINE: 1 EU/DL

## 2024-03-22 PROCEDURE — 81003 URINALYSIS AUTO W/O SCOPE: CPT | Performed by: UROLOGY

## 2024-03-22 PROCEDURE — 99203 OFFICE O/P NEW LOW 30 MIN: CPT | Performed by: UROLOGY

## 2024-03-22 PROCEDURE — 3008F BODY MASS INDEX DOCD: CPT | Performed by: UROLOGY

## 2024-03-22 PROCEDURE — 1036F TOBACCO NON-USER: CPT | Performed by: UROLOGY

## 2024-03-22 NOTE — PROGRESS NOTES
03/22/2024  63-year-old gentleman presents an elevated PSA, mild voiding symptoms mild ED    Patient has no nausea, no vomiting, no fever.    Exam: Circumcised, normal penis normal testes    TEZ: 1+, no nodule    We discussed mild elevated PSA, prostate biopsy versus monitoring PSA  We discussed benign prostate hypertrophy with mild voiding symptom  We discussed erectile dysfunction, medications  All the questions were answered, the patient expressed understanding and agreed to the plan.    Impression  Elevated PSA-mild  BPH  ED    Plan  Conservative management  Repeat PSA in 6 months  Appointment in 6 months  Possible prostate biopsy in the future    New Patient   Chief Complaint   Patient presents with    Elevated PSA     New patient here for elevated PSA. No family hx or personal hx of cancer. Voiding well. Nocturia 1-2x. Patient has gained 40 lbs in the last 6 months. Denies bone pain.         Physical Exam     TODAYS LAB RESULTS:    PSA 02/19/2024  6.18  PSA 03/27/2023  4.68    POC Glucose, Urine  NEGATIVE mg/dl NEGATIVE   POC Bilirubin, Urine  NEGATIVE NEGATIVE   POC Ketones, Urine  NEGATIVE mg/dl TRACE Abnormal    POC Specific Gravity, Urine  1.005 - 1.035 1.025   POC Blood, Urine  NEGATIVE NEGATIVE   POC PH, Urine  No Reference Range Established PH 6.0   POC Protein, Urine  NEGATIVE, 30 (1+) mg/dl NEGATIVE   POC Urobilinogen, Urine  0.2, 1.0 EU/DL 1.0   Poc Nitrite, Urine  NEGATIVE NEGATIVE   POC Leukocytes, Urine  NEGATIVE NEGATIVE     ASSESSMENT&PLAN:      IMPRESSIONS:     PSA   02/19/2024  6.18  03/27/2023  4.68

## 2024-05-23 ENCOUNTER — TELEPHONE (OUTPATIENT)
Dept: PRIMARY CARE | Facility: CLINIC | Age: 64
End: 2024-05-23
Payer: COMMERCIAL

## 2024-05-23 NOTE — TELEPHONE ENCOUNTER
"Patient called in and reported ear fullness and a constant ringing/\"white noise\" for the last two weeks. Patient is scheduled to see ENT but not until June. He was wanting to know if he could come in for an ear irrigation, please advise.   "

## 2024-06-04 ENCOUNTER — OFFICE VISIT (OUTPATIENT)
Dept: PRIMARY CARE | Facility: CLINIC | Age: 64
End: 2024-06-04
Payer: COMMERCIAL

## 2024-06-04 VITALS
DIASTOLIC BLOOD PRESSURE: 70 MMHG | HEART RATE: 75 BPM | OXYGEN SATURATION: 95 % | WEIGHT: 315 LBS | BODY MASS INDEX: 41.34 KG/M2 | SYSTOLIC BLOOD PRESSURE: 120 MMHG | TEMPERATURE: 97.7 F

## 2024-06-04 DIAGNOSIS — H61.23 BILATERAL IMPACTED CERUMEN: Primary | ICD-10-CM

## 2024-06-04 PROCEDURE — 3008F BODY MASS INDEX DOCD: CPT | Performed by: INTERNAL MEDICINE

## 2024-06-04 PROCEDURE — 99213 OFFICE O/P EST LOW 20 MIN: CPT | Performed by: INTERNAL MEDICINE

## 2024-06-04 PROCEDURE — 3078F DIAST BP <80 MM HG: CPT | Performed by: INTERNAL MEDICINE

## 2024-06-04 PROCEDURE — 3074F SYST BP LT 130 MM HG: CPT | Performed by: INTERNAL MEDICINE

## 2024-06-04 ASSESSMENT — ENCOUNTER SYMPTOMS
RESPIRATORY NEGATIVE: 1
CONSTITUTIONAL NEGATIVE: 1
HEMATOLOGIC/LYMPHATIC NEGATIVE: 1
GASTROINTESTINAL NEGATIVE: 1
MUSCULOSKELETAL NEGATIVE: 1
CARDIOVASCULAR NEGATIVE: 1
ENDOCRINE NEGATIVE: 1
PSYCHIATRIC NEGATIVE: 1
EYES NEGATIVE: 1
NEUROLOGICAL NEGATIVE: 1
ALLERGIC/IMMUNOLOGIC NEGATIVE: 1

## 2024-06-04 NOTE — PROGRESS NOTES
"Subjective   Patient ID: Steven Burks is a 63 y.o. male who presents for No chief complaint on file..  Patient presents today with a complaint of a \"white noise\" in his ears almost like ringing.  He is concerned that he may have an ear wax issue.  No other associated complaints or problems at this time.        Review of Systems   Constitutional: Negative.    HENT:  Positive for tinnitus.    Eyes: Negative.    Respiratory: Negative.     Cardiovascular: Negative.    Gastrointestinal: Negative.    Endocrine: Negative.    Genitourinary: Negative.    Musculoskeletal: Negative.    Skin: Negative.    Allergic/Immunologic: Negative.    Neurological: Negative.    Hematological: Negative.    Psychiatric/Behavioral: Negative.         Objective     Blood pressure 120/70, pulse 75, temperature 36.5 °C (97.7 °F), temperature source Temporal, weight 146 kg (322 lb), SpO2 95%.   Physical Exam  Constitutional:       Appearance: Normal appearance.   HENT:      Right Ear: There is impacted cerumen.      Left Ear: There is impacted cerumen.   Pulmonary:      Effort: Pulmonary effort is normal.   Musculoskeletal:      Cervical back: Normal range of motion and neck supple.   Neurological:      General: No focal deficit present.      Mental Status: He is alert and oriented to person, place, and time.   Psychiatric:         Mood and Affect: Mood normal.         Behavior: Behavior normal.         Assessment/Plan   Problem List Items Addressed This Visit    None  Visit Diagnoses       Bilateral impacted cerumen    -  Primary    Relevant Orders    Ear Cerumen Removal          The cerumen, on exam, is deep in the canals and appears hard.  I am advising that he treat with Debrox bid for 3 days before we attempt irrigation.  He states he does have ENT appt in the future.  I advised he keep this in case we are not successful at removal.    He is asked to return in 3 days for bilateral ear irrigation.     "

## 2024-06-07 ENCOUNTER — CLINICAL SUPPORT (OUTPATIENT)
Dept: PRIMARY CARE | Facility: CLINIC | Age: 64
End: 2024-06-07
Payer: COMMERCIAL

## 2024-06-07 DIAGNOSIS — H61.23 BILATERAL IMPACTED CERUMEN: ICD-10-CM

## 2024-06-07 PROCEDURE — 69209 REMOVE IMPACTED EAR WAX UNI: CPT | Performed by: INTERNAL MEDICINE

## 2024-06-07 NOTE — PROGRESS NOTES
Patient ID: Steven Burks is a 63 y.o. male.    Ear Cerumen Removal    Date/Time: 6/7/2024 7:45 AM    Performed by: Henrik Rajan CMA  Authorized by: Chris Arreguin MD    Consent:     Consent obtained:  Verbal    Consent given by:  Patient    Risks, benefits, and alternatives were discussed: yes      Risks discussed:  Pain and dizziness  Universal protocol:     Patient identity confirmed:  Verbally with patient  Procedure details:     Location:  L ear and R ear    Procedure type: irrigation      Procedure outcomes: cerumen removed    Post-procedure details:     Procedure completion:  Tolerated with difficulty  Comments:      Stopped because the left ear was hurting and dr arreguin check him

## 2024-06-26 ENCOUNTER — APPOINTMENT (OUTPATIENT)
Dept: OTOLARYNGOLOGY | Facility: CLINIC | Age: 64
End: 2024-06-26
Payer: COMMERCIAL

## 2024-06-26 VITALS — BODY MASS INDEX: 41.09 KG/M2 | WEIGHT: 315 LBS

## 2024-06-26 DIAGNOSIS — H69.93 DYSFUNCTION OF BOTH EUSTACHIAN TUBES: ICD-10-CM

## 2024-06-26 DIAGNOSIS — B36.9 FUNGAL OTITIS EXTERNA: Primary | ICD-10-CM

## 2024-06-26 DIAGNOSIS — H93.13 TINNITUS OF BOTH EARS: ICD-10-CM

## 2024-06-26 DIAGNOSIS — H62.40 FUNGAL OTITIS EXTERNA: Primary | ICD-10-CM

## 2024-06-26 DIAGNOSIS — K21.9 CHRONIC GERD: ICD-10-CM

## 2024-06-26 PROCEDURE — 3008F BODY MASS INDEX DOCD: CPT | Performed by: GENERAL PRACTICE

## 2024-06-26 PROCEDURE — 69210 REMOVE IMPACTED EAR WAX UNI: CPT | Performed by: GENERAL PRACTICE

## 2024-06-26 PROCEDURE — 1036F TOBACCO NON-USER: CPT | Performed by: GENERAL PRACTICE

## 2024-06-26 PROCEDURE — 99204 OFFICE O/P NEW MOD 45 MIN: CPT | Performed by: GENERAL PRACTICE

## 2024-06-26 RX ORDER — FLUTICASONE PROPIONATE 50 MCG
2 SPRAY, SUSPENSION (ML) NASAL DAILY
Qty: 16 G | Refills: 3 | Status: SHIPPED | OUTPATIENT
Start: 2024-06-26 | End: 2025-06-26

## 2024-06-26 RX ORDER — CLOTRIMAZOLE 1 G/ML
SOLUTION TOPICAL 2 TIMES DAILY
Qty: 7 ML | Refills: 1 | Status: SHIPPED | OUTPATIENT
Start: 2024-06-26 | End: 2024-07-03

## 2024-06-26 NOTE — PROGRESS NOTES
Otolaryngology - Head and Neck Surgery Outpatient New Patient Visit Note        Assessment/Plan   Problem List Items Addressed This Visit             ICD-10-CM       Gastrointestinal and Abdominal    Chronic GERD K21.9     Other Visit Diagnoses         Codes    Fungal otitis externa    -  Primary B36.9, H62.40    Relevant Medications    clotrimazole (Lotrimin) 1 % external solution    Dysfunction of both eustachian tubes     H69.93    Relevant Medications    fluticasone (Flonase) 50 mcg/actuation nasal spray    Other Relevant Orders    Referral to Audiology    Tinnitus of both ears     H93.13    Relevant Orders    Referral to Audiology            64yoM with b/l fungal OE after recent ear debridement.  Will treat with clotrimazole  Scarring of TM and right incudopexy, but TM mobile on the right.  Poorly mobile on the left without obvious effusion.  No acute otitis.     Will treat for OE with clotrimazole.    Will acquire follow up after inflammation calms.    Discussed controls for rhinitis and reflux to aid in ETD.            Follow up:  -plan for follow up in clinic as needed, after completion of ordered studies, and in 2-4 weeks    All of the above findings, impressions, treatment planning and follow up plans were discussed with the patient who indicated understanding.  the patient was instructed to contact or return to clinic sooner if symptoms/signs persist or worsen despite the above management.      Pasha Day MD  Otolaryngology - Head and Neck Surgery            History Of Present Illness  Steven Burks is a 64 y.o. male presenting for concerns of hearing loss and some ear fullness/discomfort.     Reports treatment with ear flushing with limited response.   Reports a history of recurrent OE and AOM, but none recently.    Notes b/l hearing loss and tinnitus.    The patient reports slow progression of the hearing loss over time.  The paitent reports a history of intermittent, waxing/waning, nonpulsatile, tonal  tinnitus which does not interfere with hearing.   The patient reports a history of significant noise exposure due to occupational exposures, industrial noise, etc.     The patient denies sudden changes in hearing.  The patient denies  ,    , vertigo, or facial weakness.    The patient denies a history of otologic surgery or trauma.  The patient denies a history of blast injury, TBI or concussion associated with hearing loss.  The patient denies a family history of significant hearing loss.  The patient reports a history of AOM as a child, but no recent significant history of ear infection.  No reported exposure to known ototoxins (chemotherapeutics, aminoglycosides, loop diurectics, high dose NSAIDs).   No reported history of radiation treatment to the head/neck.     Reports a history of chronic nasal congestion ,but no significant allergic rhinitis or sinusitis.    Reports a history of GERD, controlled with PPIs.           Past Medical History  He has a past medical history of Body mass index (BMI) 35.0-35.9, adult (03/25/2020).    Surgical History  He has a past surgical history that includes Other surgical history (12/18/2019) and CT angio abdomen pelvis w and or wo IV IV contrast (10/24/2020).     Social History  He reports that he quit smoking about 3 years ago. His smoking use included cigarettes and pipe. He started smoking about 8 years ago. He has a 2.5 pack-year smoking history. He has never used smokeless tobacco. He reports current alcohol use. He reports that he does not currently use drugs after having used the following drugs: Marijuana.    Family History  Family History   Problem Relation Name Age of Onset    Hypertension Mother      Other (respitory failure) Mother      Cirrhosis Father      Hypertension Father      Hypertension Brother      Breast cancer Neg Hx          Allergies  Patient has no known allergies.    Review of Systems  ROS: Pertinent positives as noted in HPI.    - CONSTITUTIONAL:  Does not report weight loss, fever or chills.    - HEENT:   Ear: Does not report  , vertigo,    ,  , otorrhea  Nose: Does not report  , rhinorrhea, epistaxis, decreased smell  Throat: Does not report pain, dysphagia, odynophagia  Larynx: Does not report hoarseness,  difficulty breathing, pain with speaking (odynophonia)  Neck: Does not report new masses, pain, swelling  Face: Does not report sinus pain, pressure, swelling, numbness, weakness     - RESPIRATORY: Does not report SOB or cough.    - CV: Does not report palpitations or chest pain.     - GI: Does not report abdominal pain, nausea, vomiting or diarrhea.    - : Does not report dysuria or urinary frequency.    - MSK: Does not report myalgia or joint pain.    - SKIN: Does not report rash or pruritus.    - NEUROLOGICAL: Does not report headache or syncope.    - PSYCHIATRIC: Does not report recent changes in mood. Does not report anxiety or depression.         Physical Exam:     GENERAL:   Alert & Oriented to person, place and time; Normal affect and appearance. Well developed and well nourished. Conversant & cooperative with examination.     HEAD:   Normocephalic, atraumatic. No sinus tenderness to palpation. Normal parotid bilaterally. Normal facial strength.     NEUROLOGIC:   Cranial nerves II-XII grossly intact, gait WNL. Normal mood and affect.    EYES:   Extraocular movements intact. Pupils equal, round, reactive to light and accommodation. No nystagmus, no ptosis. no scleral injection.    EAR:   Normal auricle. No discomfort or TTP with manipulation.   Handheld otoscopic exam showed normal external auditory canals bilaterally. B/l small cerumen and diffuse small fungal hyphae debris.  Debrided with suction.   Right tympanic membrane clear and mobile with scarring and incudopexy without obvious erosion.   without evidence of perforation,   or middle ear effusion.   Left tympanic membrane clear and poorly mobile with scarring but without evidence of  perforation, retraction or middle ear effusion.     NOSE:   No external deformity. No external nasal lesions, lacerations, or scars. Nasal tip symmetrical with normal nasal valves.   Nasal cavity with essentially midline septum, edematous  mucosa and turbinates. No lesions, masses, purulence or polyps.     OC/OP:   Mucous membranes moist, no masses, lesions or exudates.   Normal tongue, floor of mouth, teeth, gums, lips. Normal posterior pharyngeal wall.    Normal tonsils without erythema, exudate or obvious calculi     NECK:   No neck masses or thyroid enlargement. Trachea midline. No tenderness to palpation    LYMPHATIC:   No cervical lymphadenopathy.     RESPIRATORY:   Symmetric chest elevation & no retractions. No significant hoarseness. No increased work of breathing.    CV:   No clubbing or cyanosis. No obvious edema    Skin:   No facial rashes, vesicles or lesions.     Extremities:   No gross abnormalities      Clinic Procedure    Binocular microscopy exam  Indication: tympanic membrane(s) could not be visualized adequately with handheld otoscopy.   Location:  bilateral ears  Visualization Instrument: A microscope was used to visualize through a speculum placed in the ear canal(s) to visualize the ear canal, tympanic membranes and to assist in assessment and removal of debris.  Findings:  see physical exam documentation  Patient Status: The patient tolerated the procedure well.   Complications: There were no complications.     Information review:  External sources (notes, imaging, lab results) listed below personally reviewed to aid in medical decision making process.  -PCM note Satinder 6/7/24  -PCM note Kevin 6/4/24  -PCM note Kevin 2/26/24

## 2024-07-26 ENCOUNTER — DOCUMENTATION (OUTPATIENT)
Dept: PHYSICAL THERAPY | Facility: HOSPITAL | Age: 64
End: 2024-07-26
Payer: COMMERCIAL

## 2024-07-26 NOTE — PROGRESS NOTES
Physical Therapy    Discharge Summary    Name: Steven Burks  MRN: [id number]  : 1960  Date: 24    Discharge Summary: PT        Rehab Discharge Reason: Pt. Never attended.

## 2024-08-21 ENCOUNTER — LAB (OUTPATIENT)
Dept: LAB | Facility: LAB | Age: 64
End: 2024-08-21
Payer: COMMERCIAL

## 2024-08-21 DIAGNOSIS — K86.89 SECONDARY PANCREATIC INSUFFICIENCY (HHS-HCC): ICD-10-CM

## 2024-08-21 DIAGNOSIS — K21.9 CHRONIC GERD: ICD-10-CM

## 2024-08-21 DIAGNOSIS — I10 ESSENTIAL HYPERTENSION: ICD-10-CM

## 2024-08-21 DIAGNOSIS — R97.20 PSA ELEVATION: ICD-10-CM

## 2024-08-21 LAB
ALBUMIN SERPL BCP-MCNC: 4.1 G/DL (ref 3.4–5)
ALP SERPL-CCNC: 54 U/L (ref 33–136)
ALT SERPL W P-5'-P-CCNC: 16 U/L (ref 10–52)
ANION GAP SERPL CALC-SCNC: 11 MMOL/L (ref 10–20)
AST SERPL W P-5'-P-CCNC: 18 U/L (ref 9–39)
BASOPHILS # BLD AUTO: 0.04 X10*3/UL (ref 0–0.1)
BASOPHILS NFR BLD AUTO: 0.5 %
BILIRUB SERPL-MCNC: 0.8 MG/DL (ref 0–1.2)
BUN SERPL-MCNC: 16 MG/DL (ref 6–23)
CALCIUM SERPL-MCNC: 9 MG/DL (ref 8.6–10.3)
CHLORIDE SERPL-SCNC: 105 MMOL/L (ref 98–107)
CHOLEST SERPL-MCNC: 239 MG/DL (ref 0–199)
CHOLESTEROL/HDL RATIO: 5
CO2 SERPL-SCNC: 25 MMOL/L (ref 21–32)
CREAT SERPL-MCNC: 0.96 MG/DL (ref 0.5–1.3)
EGFRCR SERPLBLD CKD-EPI 2021: 89 ML/MIN/1.73M*2
EOSINOPHIL # BLD AUTO: 0.34 X10*3/UL (ref 0–0.7)
EOSINOPHIL NFR BLD AUTO: 4.6 %
ERYTHROCYTE [DISTWIDTH] IN BLOOD BY AUTOMATED COUNT: 13.3 % (ref 11.5–14.5)
GLUCOSE SERPL-MCNC: 83 MG/DL (ref 74–99)
HCT VFR BLD AUTO: 50 % (ref 41–52)
HDLC SERPL-MCNC: 47.5 MG/DL
HGB BLD-MCNC: 17 G/DL (ref 13.5–17.5)
IMM GRANULOCYTES # BLD AUTO: 0.06 X10*3/UL (ref 0–0.7)
IMM GRANULOCYTES NFR BLD AUTO: 0.8 % (ref 0–0.9)
LDLC SERPL CALC-MCNC: 162 MG/DL
LYMPHOCYTES # BLD AUTO: 1.82 X10*3/UL (ref 1.2–4.8)
LYMPHOCYTES NFR BLD AUTO: 24.8 %
MCH RBC QN AUTO: 32.1 PG (ref 26–34)
MCHC RBC AUTO-ENTMCNC: 34 G/DL (ref 32–36)
MCV RBC AUTO: 94 FL (ref 80–100)
MONOCYTES # BLD AUTO: 0.59 X10*3/UL (ref 0.1–1)
MONOCYTES NFR BLD AUTO: 8 %
NEUTROPHILS # BLD AUTO: 4.49 X10*3/UL (ref 1.2–7.7)
NEUTROPHILS NFR BLD AUTO: 61.3 %
NON HDL CHOLESTEROL: 192 MG/DL (ref 0–149)
NRBC BLD-RTO: 0 /100 WBCS (ref 0–0)
PLATELET # BLD AUTO: 214 X10*3/UL (ref 150–450)
POTASSIUM SERPL-SCNC: 4.3 MMOL/L (ref 3.5–5.3)
PROT SERPL-MCNC: 6.6 G/DL (ref 6.4–8.2)
PSA SERPL-MCNC: 7.52 NG/ML
RBC # BLD AUTO: 5.3 X10*6/UL (ref 4.5–5.9)
SODIUM SERPL-SCNC: 137 MMOL/L (ref 136–145)
TRIGL SERPL-MCNC: 146 MG/DL (ref 0–149)
VLDL: 29 MG/DL (ref 0–40)
WBC # BLD AUTO: 7.3 X10*3/UL (ref 4.4–11.3)

## 2024-08-21 PROCEDURE — 80053 COMPREHEN METABOLIC PANEL: CPT

## 2024-08-21 PROCEDURE — 80061 LIPID PANEL: CPT

## 2024-08-21 PROCEDURE — 84153 ASSAY OF PSA TOTAL: CPT

## 2024-08-21 PROCEDURE — 85025 COMPLETE CBC W/AUTO DIFF WBC: CPT

## 2024-08-21 PROCEDURE — 36415 COLL VENOUS BLD VENIPUNCTURE: CPT

## 2024-08-27 ENCOUNTER — APPOINTMENT (OUTPATIENT)
Dept: PRIMARY CARE | Facility: CLINIC | Age: 64
End: 2024-08-27
Payer: COMMERCIAL

## 2024-08-27 VITALS
TEMPERATURE: 97 F | SYSTOLIC BLOOD PRESSURE: 122 MMHG | DIASTOLIC BLOOD PRESSURE: 72 MMHG | WEIGHT: 315 LBS | HEART RATE: 79 BPM | OXYGEN SATURATION: 97 % | BODY MASS INDEX: 42.22 KG/M2

## 2024-08-27 DIAGNOSIS — M25.562 CHRONIC PAIN OF LEFT KNEE: ICD-10-CM

## 2024-08-27 DIAGNOSIS — M17.12 PRIMARY OSTEOARTHRITIS OF LEFT KNEE: ICD-10-CM

## 2024-08-27 DIAGNOSIS — R97.20 PSA ELEVATION: ICD-10-CM

## 2024-08-27 DIAGNOSIS — M19.90 ARTHRITIS: ICD-10-CM

## 2024-08-27 DIAGNOSIS — I10 ESSENTIAL HYPERTENSION: Primary | ICD-10-CM

## 2024-08-27 DIAGNOSIS — K86.89 SECONDARY PANCREATIC INSUFFICIENCY (HHS-HCC): ICD-10-CM

## 2024-08-27 DIAGNOSIS — K21.9 CHRONIC GERD: ICD-10-CM

## 2024-08-27 DIAGNOSIS — E66.01 CLASS 3 SEVERE OBESITY DUE TO EXCESS CALORIES WITHOUT SERIOUS COMORBIDITY WITH BODY MASS INDEX (BMI) OF 40.0 TO 44.9 IN ADULT (MULTI): ICD-10-CM

## 2024-08-27 DIAGNOSIS — E78.00 HYPERCHOLESTEROLEMIA: ICD-10-CM

## 2024-08-27 DIAGNOSIS — M48.062 SPINAL STENOSIS OF LUMBAR REGION WITH NEUROGENIC CLAUDICATION: ICD-10-CM

## 2024-08-27 DIAGNOSIS — G62.89 OTHER POLYNEUROPATHY: ICD-10-CM

## 2024-08-27 DIAGNOSIS — G89.29 CHRONIC PAIN OF LEFT KNEE: ICD-10-CM

## 2024-08-27 PROCEDURE — G2211 COMPLEX E/M VISIT ADD ON: HCPCS | Performed by: INTERNAL MEDICINE

## 2024-08-27 PROCEDURE — 3074F SYST BP LT 130 MM HG: CPT | Performed by: INTERNAL MEDICINE

## 2024-08-27 PROCEDURE — 99215 OFFICE O/P EST HI 40 MIN: CPT | Performed by: INTERNAL MEDICINE

## 2024-08-27 PROCEDURE — 3078F DIAST BP <80 MM HG: CPT | Performed by: INTERNAL MEDICINE

## 2024-08-27 RX ORDER — MELOXICAM 15 MG/1
15 TABLET ORAL DAILY
Qty: 90 TABLET | Refills: 1 | Status: SHIPPED | OUTPATIENT
Start: 2024-08-27

## 2024-08-27 RX ORDER — PANCRELIPASE 36000; 180000; 114000 [USP'U]/1; [USP'U]/1; [USP'U]/1
2 CAPSULE, DELAYED RELEASE PELLETS ORAL
Qty: 540 CAPSULE | Refills: 1 | Status: SHIPPED | OUTPATIENT
Start: 2024-08-27

## 2024-08-27 RX ORDER — VERAPAMIL HYDROCHLORIDE 180 MG/1
180 TABLET, FILM COATED, EXTENDED RELEASE ORAL DAILY
Qty: 90 TABLET | Refills: 1 | Status: SHIPPED | OUTPATIENT
Start: 2024-08-27

## 2024-08-27 RX ORDER — GABAPENTIN 400 MG/1
400 CAPSULE ORAL 2 TIMES DAILY
Qty: 180 CAPSULE | Refills: 1 | Status: SHIPPED | OUTPATIENT
Start: 2024-08-27

## 2024-08-27 ASSESSMENT — ENCOUNTER SYMPTOMS
GASTROINTESTINAL NEGATIVE: 1
CARDIOVASCULAR NEGATIVE: 1
ALLERGIC/IMMUNOLOGIC NEGATIVE: 1
MUSCULOSKELETAL NEGATIVE: 1
HEMATOLOGIC/LYMPHATIC NEGATIVE: 1
PSYCHIATRIC NEGATIVE: 1
EYES NEGATIVE: 1
NEUROLOGICAL NEGATIVE: 1
ENDOCRINE NEGATIVE: 1
RESPIRATORY NEGATIVE: 1
CONSTITUTIONAL NEGATIVE: 1

## 2024-08-27 NOTE — PROGRESS NOTES
Subjective   Patient ID: Steven Burks is a 64 y.o. male who presents for 6 month follow up .  Patient presents in follow up regarding hypertension.  Blood pressure has been well controlled on current therapy.  No adverse effects of medication reported.  Patient denies chest pain, palpitations, shortness of breath, orthopnea, fatigue or edema.    Pancreatic insufficiency Sx are well compensated on current therapy without side effects other than he complains about the cost of the therapy.    He is currently not taking the medications for his anxiety due to side effects and states he has felt better since stopping these as well as stopping the use of cannabis.  He states that he is retiring from work and this has relieved much of his stress and Sx.        Review of Systems   Constitutional: Negative.    HENT: Negative.     Eyes: Negative.    Respiratory: Negative.     Cardiovascular: Negative.    Gastrointestinal: Negative.    Endocrine: Negative.    Genitourinary: Negative.    Musculoskeletal: Negative.    Skin: Negative.    Allergic/Immunologic: Negative.    Neurological: Negative.    Hematological: Negative.    Psychiatric/Behavioral: Negative.         Objective     Blood pressure 122/72, pulse 79, temperature 36.1 °C (97 °F), temperature source Temporal, weight 149 kg (328 lb 12.8 oz), SpO2 97%.   Physical Exam  Constitutional:       Appearance: Normal appearance.   Neck:      Vascular: No carotid bruit.   Cardiovascular:      Rate and Rhythm: Normal rate and regular rhythm.      Pulses: Normal pulses.      Heart sounds: Normal heart sounds. No murmur heard.  Pulmonary:      Effort: Pulmonary effort is normal.      Breath sounds: Normal breath sounds. No wheezing or rales.   Abdominal:      General: Abdomen is flat. There is no distension.      Palpations: Abdomen is soft.      Tenderness: There is no abdominal tenderness.   Musculoskeletal:         General: Normal range of motion.      Cervical back: Normal range  of motion and neck supple.   Skin:     General: Skin is warm and dry.   Neurological:      General: No focal deficit present.      Mental Status: He is alert and oriented to person, place, and time.   Psychiatric:         Mood and Affect: Mood normal.         Behavior: Behavior normal.         Assessment/Plan   Problem List Items Addressed This Visit       Arthritis    Relevant Medications    meloxicam (Mobic) 15 mg tablet    Chronic GERD    Relevant Orders    CBC and Auto Differential    Essential hypertension - Primary    Relevant Medications    verapamil SR (Calan-SR) 180 mg ER tablet    Other Relevant Orders    Comprehensive Metabolic Panel    CBC and Auto Differential    Peripheral neuropathy    Relevant Medications    gabapentin (Neurontin) 400 mg capsule    Secondary pancreatic insufficiency (HHS-HCC)    Relevant Medications    pancrelipase, Lip-Prot-Amyl, (Creon) 36,000-114,000- 180,000 unit capsule,delayed release(DR/EC) capsule    Class 3 severe obesity due to excess calories without serious comorbidity with body mass index (BMI) of 40.0 to 44.9 in adult (Multi)    PSA elevation     Other Visit Diagnoses       Chronic pain of left knee        Relevant Orders    Disability Placard    Primary osteoarthritis of left knee        Relevant Orders    Disability Placard    Spinal stenosis of lumbar region with neurogenic claudication        Relevant Orders    Disability Placard    Hypercholesterolemia        Relevant Orders    Lipid Panel          BP well controlled on current therapy.  Will continue present therapy and follow.  Pancreatic insufficiency Sx well compensated on current therapy.  Will continue present therapy and follow.  I do note a significant rise in LDL on most recent lab.  Diet versus medication discussed and he wishes to avoid medication at this time.  Will follow.  Arthritis Sx overall well compensated on current therapy though he states he requires additional ibuprofen especially since he  stopped cannabis use.  Will continue present therapy and follow.   GERD Sx well compensated on current therapy.  Will continue present therapy and follow clinically.   Neuropathy Sx overall well compensated on current therapy.  Will continue present therapy and follow.  He states that he has retired and is feeling much better overall with less stress and anxiety.  It is noted that he had gained significant weight last year, but he has been able to lose 21# over the last 6 months and is encouraged.    Pt. advised on improving dietary choices and portion control as well as increasing exercise to promote weight loss.   He saw Ortho re:  left medial knee pain due to moderate osteoarthritis.  He states the steroid injection was helpful.  It does affect ambulation, he states.    Urology note reviewed in re:  rise in PSA.  Repeat PSA recently has shown a further elevation of PSA to >7.  He is to follow up with Urology which he has not scheduled.  In re:  previous complaint of tinnitus, he saw ENT and was treated for otitis externa as well as Eustachian tube dysfunction and asked to follow up after 2-4 weeks which he did not do.  Final issue is that he states if he walks any distance, he develops numbness in both legs due to spinal claudication.  For this reason, he requests disability parking which I believe he does qualify for.    > 45 minutes was spent with the patient today, the majority of which was spent on review of history and medications as well as counselling on care plan and/or coordination of care.  He was also strongly advised to keep follow up appts with specific subspecialists as they have requested.      Follow up in 3 months  Lab to be drawn 1 week prior to next office visit.

## 2024-08-29 ENCOUNTER — APPOINTMENT (OUTPATIENT)
Dept: OTOLARYNGOLOGY | Facility: CLINIC | Age: 64
End: 2024-08-29
Payer: COMMERCIAL

## 2024-09-12 ENCOUNTER — CLINICAL SUPPORT (OUTPATIENT)
Dept: AUDIOLOGY | Facility: HOSPITAL | Age: 64
End: 2024-09-12
Payer: COMMERCIAL

## 2024-09-12 DIAGNOSIS — H90.3 SENSORINEURAL HEARING LOSS, ASYMMETRICAL: Primary | ICD-10-CM

## 2024-09-12 DIAGNOSIS — H93.13 TINNITUS OF BOTH EARS: ICD-10-CM

## 2024-09-12 DIAGNOSIS — H69.93 DYSFUNCTION OF BOTH EUSTACHIAN TUBES: ICD-10-CM

## 2024-09-12 PROCEDURE — 92550 TYMPANOMETRY & REFLEX THRESH: CPT | Performed by: AUDIOLOGIST

## 2024-09-12 PROCEDURE — 92557 COMPREHENSIVE HEARING TEST: CPT | Performed by: AUDIOLOGIST

## 2024-09-12 ASSESSMENT — PAIN SCALES - GENERAL: PAINLEVEL_OUTOF10: 0 - NO PAIN

## 2024-09-12 ASSESSMENT — PAIN - FUNCTIONAL ASSESSMENT: PAIN_FUNCTIONAL_ASSESSMENT: 0-10

## 2024-09-12 NOTE — LETTER
2024         Pasha Day MD  [address] [facility]  [address]  [facility] [address]      Patient: Steven Burks   YOB: 1960   Date of Visit: 2024       Dear Pasha Day MD:    Thank you for referring Steven Burks to me for evaluation. Below are my notes for this consultation.  If you have questions, please do not hesitate to call me. I look forward to following your patient along with you.       Sincerely,     EROS Hanley, CCC-A  Senior Audiologist      CC:   Chris Brown MD  ______________________________________________________________________________________    AUDIOLOGY ADULT AUDIOMETRIC EVALUATION      Name:  Steven Burks  :  1960  Age:  64 y.o.  Date of Evaluation:  2024     IMPRESSIONS:  Today's test results are consistent with overall mild to severe sensorineural hearing loss bilaterally, worse in the left ear from 3000-4000Hz and worse in the right ear at 125-250Hz and 8000Hz.  His word discrimination scores are good bilaterally.  His tympanograms show normal tympanic membrane mobility bilaterally on the day of testing.  If there are no medical contraindications, this patient could be considered a good candidate for binaural amplification.      RECOMMENDATIONS:  -Medical / Otolaryngology consultation.  Follow-up with Dr. Day  -Annual audiologic monitoring, or as changes are noted.  -Hearing Aid consultation, if cleared medically. Patient should check with insurance regarding benefits and covered providers.   -Use of hearing protection devices whenever loud noises cannot be avoided.  -Enrichment of the sound environment (such as use of soft music, fan, or noise generators) and use of hearing aids to reduce tinnitus annoyance.    ------------------------------------------------    HISTORY:  Reason for visit:  Mr. Burks is seen today at the request of Pasha Day MD for an evaluation of hearing.  Patient complains of Tinnitus and Hearing Loss  Hearing seems to  "be worse in the left ear since June 2024.       Per Dr. Day's note of 6-, he has diagnoses of fungal otitis externa, Eustachian tube disorder, and tinnitus bilaterally     Tinnitus:   yes, bilateral ear, occurs occasionally, described as ringing with varying intensity, onset in June 2024, not generally bothersome, not pulsatile  Aural Fullness:  left ear, since June 2024, cannot \"pop\" ears open to improve hearing  Otitis Media: yes, otitis media and swimmer's ear, throughout life  Noise Exposure: yes, in workplace with machinery, some use of hearing protection devices in later years    Previous hearing test:   no  Otalgia:  no  PE Tubes:  no  Other otologic surgical history:  no  Dizziness:  no  Family history of hearing loss:  no  Hearing aid history: none  Other significant history: none    EVALUATION    Otoscopic Evaluation:        Wet, nonoccluding cerumen in the ear canal, tympanic membrane not well visualized bilaterally                     Immittance Measures: 226Hz       Both ears:  Tympanogram shows normal middle ear pressure, static compliance, and ear canal volume.  Acoustic reflexes were absent.  Poor canal seal maintenance bilaterally.        Pure Tone Audiometry:  Conventional audiometry (125-8000Hz) via insert earphones with good response reliability      Right ear:  mild to severe sensorineural hearing loss with notch of normal hearing sensitivity at 1000Hz       Left ear:  mild to severe sensorineural hearing loss with notch of normal hearing sensitivity at 250Hz    Speech Audiometry:        Right ear:  Speech Reception Threshold (SRT) was obtained at 20 dBHL                 Word Recognition scores were good at 40dBSL re: SRT       Left ear:  Speech Reception Threshold (SRT) was obtained at 25 dBHL                 Word Recognition scores were good at 40dBSL re: SRT              Distortion Product Otoacoustic Emissions: Assesses the cochlear outer hair cell function (8928-8472 Hz frequency " range)        Both ears:  absent 1500-8000Hz        PATIENT EDUCATION:   Discussed results and recommendations with Mr. Burks.  Questions were addressed and the patient was encouraged to contact our department should concerns arise.    Time:  09;32 to 10:10    EROS Hanley, CCC-A  Senior Audiologist

## 2024-09-12 NOTE — PROGRESS NOTES
"AUDIOLOGY ADULT AUDIOMETRIC EVALUATION      Name:  Steven Burks  :  1960  Age:  64 y.o.  Date of Evaluation:  2024     IMPRESSIONS:  Today's test results are consistent with overall mild to severe sensorineural hearing loss bilaterally, worse in the left ear from 3000-4000Hz and worse in the right ear at 125-250Hz and 8000Hz.  His word discrimination scores are good bilaterally.  His tympanograms show normal tympanic membrane mobility bilaterally on the day of testing.  If there are no medical contraindications, this patient could be considered a good candidate for binaural amplification.      RECOMMENDATIONS:  -Medical / Otolaryngology consultation.  Follow-up with Dr. Day  -Annual audiologic monitoring, or as changes are noted.  -Hearing Aid consultation, if cleared medically. Patient should check with insurance regarding benefits and covered providers.   -Use of hearing protection devices whenever loud noises cannot be avoided.  -Enrichment of the sound environment (such as use of soft music, fan, or noise generators) and use of hearing aids to reduce tinnitus annoyance.    ------------------------------------------------    HISTORY:  Reason for visit:  Mr. Burks is seen today at the request of Pasha Day MD for an evaluation of hearing.  Patient complains of Tinnitus and Hearing Loss  Hearing seems to be worse in the left ear since 2024.       Per Dr. Day's note of 2024, he has diagnoses of fungal otitis externa, Eustachian tube disorder, and tinnitus bilaterally     Tinnitus:   yes, bilateral ear, occurs occasionally, described as ringing with varying intensity, onset in 2024, not generally bothersome, not pulsatile  Aural Fullness:  left ear, since 2024, cannot \"pop\" ears open to improve hearing  Otitis Media: yes, otitis media and swimmer's ear, throughout life  Noise Exposure: yes, in workplace with machinery, some use of hearing protection devices in later " years    Previous hearing test:   no  Otalgia:  no  PE Tubes:  no  Other otologic surgical history:  no  Dizziness:  no  Family history of hearing loss:  no  Hearing aid history: none  Other significant history: none    EVALUATION    Otoscopic Evaluation:        Wet, nonoccluding cerumen in the ear canal, tympanic membrane not well visualized bilaterally                     Immittance Measures: 226Hz       Both ears:  Tympanogram shows normal middle ear pressure, static compliance, and ear canal volume.  Acoustic reflexes were absent.  Poor canal seal maintenance bilaterally.        Pure Tone Audiometry:  Conventional audiometry (125-8000Hz) via insert earphones with good response reliability      Right ear:  mild to severe sensorineural hearing loss with notch of normal hearing sensitivity at 1000Hz       Left ear:  mild to severe sensorineural hearing loss with notch of normal hearing sensitivity at 250Hz    Speech Audiometry:        Right ear:  Speech Reception Threshold (SRT) was obtained at 20 dBHL                 Word Recognition scores were good at 40dBSL re: SRT       Left ear:  Speech Reception Threshold (SRT) was obtained at 25 dBHL                 Word Recognition scores were good at 40dBSL re: SRT              Distortion Product Otoacoustic Emissions: Assesses the cochlear outer hair cell function (7592-0689 Hz frequency range)        Both ears:  absent 1500-8000Hz        PATIENT EDUCATION:   Discussed results and recommendations with Mr. Burks.  Questions were addressed and the patient was encouraged to contact our department should concerns arise.    Time:  09;32 to 10:10    EROS Hanley, CCC-A  Senior Audiologist

## 2024-09-26 ENCOUNTER — APPOINTMENT (OUTPATIENT)
Dept: UROLOGY | Facility: CLINIC | Age: 64
End: 2024-09-26
Payer: COMMERCIAL

## 2024-09-26 VITALS — HEIGHT: 75 IN | BODY MASS INDEX: 39.17 KG/M2 | WEIGHT: 315 LBS

## 2024-09-26 DIAGNOSIS — R97.20 PSA ELEVATION: Primary | ICD-10-CM

## 2024-09-26 DIAGNOSIS — N40.1 BENIGN PROSTATIC HYPERPLASIA WITH LOWER URINARY TRACT SYMPTOMS, SYMPTOM DETAILS UNSPECIFIED: ICD-10-CM

## 2024-09-26 PROCEDURE — 1036F TOBACCO NON-USER: CPT | Performed by: UROLOGY

## 2024-09-26 PROCEDURE — 3008F BODY MASS INDEX DOCD: CPT | Performed by: UROLOGY

## 2024-09-26 PROCEDURE — 99213 OFFICE O/P EST LOW 20 MIN: CPT | Performed by: UROLOGY

## 2024-09-26 ASSESSMENT — PAIN SCALES - GENERAL: PAINLEVEL: 0-NO PAIN

## 2024-09-26 NOTE — PROGRESS NOTES
09/26/2024  Here for elevated PSA follow-up.  PSA further up to 7.52 but patient still not ready for prostate biopsy.    We discussed further rising PSA, indication for prostate biopsy: Risk-benefit alternative  All the questions were answered, the patient expressed understanding and agreed to the plan.    Impression  Elevated PSA-rising  BPH  ED     Plan  Continue conservative management  Repeat PSA in 3 months  Appointment in 3 months  Possible prostate biopsy in the future    Chief Complaint   Patient presents with    Elevated PSA     Pt here for 6 month  follow up        Physical Exam     TODAYS LAB RESULTS:    PSA 7.52  ASSESSMENT&PLAN:      IMPRESSIONS:          03/22/2024  63-year-old gentleman presents an elevated PSA, mild voiding symptoms mild ED     Patient has no nausea, no vomiting, no fever.     Exam: Circumcised, normal penis normal testes     TEZ: 1+, no nodule     We discussed mild elevated PSA, prostate biopsy versus monitoring PSA  We discussed benign prostate hypertrophy with mild voiding symptom  We discussed erectile dysfunction, medications  All the questions were answered, the patient expressed understanding and agreed to the plan.     Impression  Elevated PSA-mild  BPH  ED     Plan  Conservative management  Repeat PSA in 6 months  Appointment in 6 months  Possible prostate biopsy in the future     New Patient        Chief Complaint   Patient presents with    Elevated PSA       New patient here for elevated PSA. No family hx or personal hx of cancer. Voiding well. Nocturia 1-2x. Patient has gained 40 lbs in the last 6 months. Denies bone pain.          Physical Exam      TODAYS LAB RESULTS:     PSA 02/19/2024  6.18  PSA 03/27/2023  4.68     POC Glucose, Urine  NEGATIVE mg/dl NEGATIVE   POC Bilirubin, Urine  NEGATIVE NEGATIVE   POC Ketones, Urine  NEGATIVE mg/dl TRACE Abnormal    POC Specific Gravity, Urine  1.005 - 1.035 1.025   POC Blood, Urine  NEGATIVE NEGATIVE   POC PH, Urine  No  Reference Range Established PH 6.0   POC Protein, Urine  NEGATIVE, 30 (1+) mg/dl NEGATIVE   POC Urobilinogen, Urine  0.2, 1.0 EU/DL 1.0   Poc Nitrite, Urine  NEGATIVE NEGATIVE   POC Leukocytes, Urine  NEGATIVE NEGATIVE      ASSESSMENT&PLAN:        IMPRESSIONS:      PSA   8/21/2024 7.52  02/19/2024  6.18  03/27/2023  4.68

## 2024-12-10 ENCOUNTER — LAB (OUTPATIENT)
Dept: LAB | Facility: LAB | Age: 64
End: 2024-12-10
Payer: COMMERCIAL

## 2024-12-10 DIAGNOSIS — N40.1 BENIGN PROSTATIC HYPERPLASIA WITH LOWER URINARY TRACT SYMPTOMS, SYMPTOM DETAILS UNSPECIFIED: ICD-10-CM

## 2024-12-10 DIAGNOSIS — R97.20 PSA ELEVATION: ICD-10-CM

## 2024-12-10 LAB — PSA SERPL-MCNC: 5.25 NG/ML

## 2024-12-10 PROCEDURE — 36415 COLL VENOUS BLD VENIPUNCTURE: CPT

## 2024-12-10 PROCEDURE — 84153 ASSAY OF PSA TOTAL: CPT

## 2024-12-16 ENCOUNTER — APPOINTMENT (OUTPATIENT)
Dept: UROLOGY | Facility: CLINIC | Age: 64
End: 2024-12-16
Payer: COMMERCIAL

## 2024-12-16 VITALS
HEART RATE: 86 BPM | DIASTOLIC BLOOD PRESSURE: 84 MMHG | HEIGHT: 74 IN | SYSTOLIC BLOOD PRESSURE: 142 MMHG | BODY MASS INDEX: 40.43 KG/M2 | WEIGHT: 315 LBS

## 2024-12-16 DIAGNOSIS — N40.1 BENIGN PROSTATIC HYPERPLASIA WITH LOWER URINARY TRACT SYMPTOMS, SYMPTOM DETAILS UNSPECIFIED: ICD-10-CM

## 2024-12-16 DIAGNOSIS — R97.20 PSA ELEVATION: Primary | ICD-10-CM

## 2024-12-16 LAB
POC APPEARANCE, URINE: CLEAR
POC BILIRUBIN, URINE: NEGATIVE
POC BLOOD, URINE: ABNORMAL
POC COLOR, URINE: YELLOW
POC GLUCOSE, URINE: NEGATIVE MG/DL
POC KETONES, URINE: NEGATIVE MG/DL
POC LEUKOCYTES, URINE: NEGATIVE
POC NITRITE,URINE: NEGATIVE
POC PH, URINE: 7 PH
POC PROTEIN, URINE: NEGATIVE MG/DL
POC SPECIFIC GRAVITY, URINE: 1.02
POC UROBILINOGEN, URINE: 0.2 EU/DL

## 2024-12-16 PROCEDURE — 81003 URINALYSIS AUTO W/O SCOPE: CPT | Performed by: UROLOGY

## 2024-12-16 PROCEDURE — 99213 OFFICE O/P EST LOW 20 MIN: CPT | Performed by: UROLOGY

## 2024-12-16 PROCEDURE — 3079F DIAST BP 80-89 MM HG: CPT | Performed by: UROLOGY

## 2024-12-16 PROCEDURE — 3077F SYST BP >= 140 MM HG: CPT | Performed by: UROLOGY

## 2024-12-16 PROCEDURE — 3008F BODY MASS INDEX DOCD: CPT | Performed by: UROLOGY

## 2024-12-16 NOTE — PROGRESS NOTES
12/16/2024  Patient here for elevated PSA follow-up.  Patient expressed desire to follow-up with PCP    PSA down to 5.25     PSA   12/10/24 5.25    We discussed elevated PSA, stable  We discussed follow-up of PSA 6 months in a year with PCP and call back as needed  All the questions were answered, the patient expressed understanding and agreed to the plan.    Impression  Elevated PSA-stable  BPH  ED     Plan  Continue conservative management  Recommend PSA check in 6 months and 1 year with PCP(prefers)  Call back if problem  Possible prostate biopsy in the future    Chief Complaint   Patient presents with    Follow-up     Pt here for follow up for his psa  Elevated PSA-rising  BPH  ED          Physical Exam     TODAYS LAB RESULTS:     Contains abnormal data POCT UA Automated manually resulted  Order: [id number]   Status: Final result       Visible to patient: No (inaccessible in [facility])       Next appt: 02/27/2025 at 07:00 AM in [facility] (Chris Brown MD)       Dx: Benign prostatic hyperplasia with low...    0 Result Notes       Component  Ref Range & Units 09:31 8 mo ago   POC Color, Urine  Straw, Yellow, Light-Yellow Yellow    POC Appearance, Urine  Clear Clear    POC Glucose, Urine  NEGATIVE mg/dl NEGATIVE NEGATIVE   POC Bilirubin, Urine  NEGATIVE NEGATIVE NEGATIVE   POC Ketones, Urine  NEGATIVE mg/dl NEGATIVE TRACE Abnormal    POC Specific Gravity, Urine  1.005 - 1.035 1.020 1.025   POC Blood, Urine  NEGATIVE TRACE-Intact Abnormal  NEGATIVE   POC PH, Urine  No Reference Range Established PH 7.0 6.0   POC Protein, Urine  NEGATIVE, 30 (1+) mg/dl NEGATIVE NEGATIVE   POC Urobilinogen, Urine  0.2, 1.0 EU/DL 0.2 1.0   Poc Nitrite, Urine  NEGATIVE NEGATIVE NEGATIVE   POC Leukocytes, Urine  NEGATIVE NEGATIVE NEGATIVE          Contains abnormal data PSA  Order: [id number]   Status: Final result       Visible to patient: No (inaccessible in [facility])       Dx: PSA elevation; Benign prostatic hyper...    1 Result  Note       1 Patient Communication         Component  Ref Range & Units 6 d ago 3 mo ago 10 mo ago 1 yr ago   Prostate Specific AG  <=4.00 ng/mL 5.25 High  7.52 High  6.18 High  4.68 High  R, CM   Resulting Agency [facility]               ASSESSMENT&PLAN:      IMPRESSIONS:    09/26/2024  Here for elevated PSA follow-up.  PSA further up to 7.52 but patient still not ready for prostate biopsy.     We discussed further rising PSA, indication for prostate biopsy: Risk-benefit alternative  All the questions were answered, the patient expressed understanding and agreed to the plan.     Impression  Elevated PSA-rising  BPH  ED     Plan  Continue conservative management  Repeat PSA in 3 months  Appointment in 3 months  Possible prostate biopsy in the future          Chief Complaint   Patient presents with    Elevated PSA       Pt here for 6 month  follow up         Physical Exam      TODAYS LAB RESULTS:     PSA 7.52  ASSESSMENT&PLAN:        IMPRESSIONS:           03/22/2024  63-year-old gentleman presents an elevated PSA, mild voiding symptoms mild ED     Patient has no nausea, no vomiting, no fever.     Exam: Circumcised, normal penis normal testes     TEZ: 1+, no nodule     We discussed mild elevated PSA, prostate biopsy versus monitoring PSA  We discussed benign prostate hypertrophy with mild voiding symptom  We discussed erectile dysfunction, medications  All the questions were answered, the patient expressed understanding and agreed to the plan.     Impression  Elevated PSA-mild  BPH  ED     Plan  Conservative management  Repeat PSA in 6 months  Appointment in 6 months  Possible prostate biopsy in the future     New Patient           Chief Complaint   Patient presents with    Elevated PSA       New patient here for elevated PSA. No family hx or personal hx of cancer. Voiding well. Nocturia 1-2x. Patient has gained 40 lbs in the last 6 months. Denies bone pain.          Physical Exam       TODAYS LAB RESULTS:     PSA 02/19/2024  6.18  PSA 03/27/2023  4.68     POC Glucose, Urine  NEGATIVE mg/dl NEGATIVE   POC Bilirubin, Urine  NEGATIVE NEGATIVE   POC Ketones, Urine  NEGATIVE mg/dl TRACE Abnormal    POC Specific Gravity, Urine  1.005 - 1.035 1.025   POC Blood, Urine  NEGATIVE NEGATIVE   POC PH, Urine  No Reference Range Established PH 6.0   POC Protein, Urine  NEGATIVE, 30 (1+) mg/dl NEGATIVE   POC Urobilinogen, Urine  0.2, 1.0 EU/DL 1.0   Poc Nitrite, Urine  NEGATIVE NEGATIVE   POC Leukocytes, Urine  NEGATIVE NEGATIVE      ASSESSMENT&PLAN:        IMPRESSIONS:      PSA   12/10/24 5.25  8/21/2024 7.52  02/19/2024  6.18  03/27/2023  4.68

## 2025-02-20 LAB
ALBUMIN SERPL-MCNC: 4.4 G/DL (ref 3.6–5.1)
ALP SERPL-CCNC: 63 U/L (ref 35–144)
ALT SERPL-CCNC: 16 U/L (ref 9–46)
ANION GAP SERPL CALCULATED.4IONS-SCNC: 9 MMOL/L (CALC) (ref 7–17)
AST SERPL-CCNC: 19 U/L (ref 10–35)
BASOPHILS # BLD AUTO: 57 CELLS/UL (ref 0–200)
BASOPHILS NFR BLD AUTO: 0.7 %
BILIRUB SERPL-MCNC: 0.6 MG/DL (ref 0.2–1.2)
BUN SERPL-MCNC: 15 MG/DL (ref 7–25)
CALCIUM SERPL-MCNC: 9.2 MG/DL (ref 8.6–10.3)
CHLORIDE SERPL-SCNC: 103 MMOL/L (ref 98–110)
CHOLEST SERPL-MCNC: 238 MG/DL
CHOLEST/HDLC SERPL: 5.7 (CALC)
CO2 SERPL-SCNC: 26 MMOL/L (ref 20–32)
CREAT SERPL-MCNC: 0.82 MG/DL (ref 0.7–1.35)
EGFRCR SERPLBLD CKD-EPI 2021: 99 ML/MIN/1.73M2
EOSINOPHIL # BLD AUTO: 402 CELLS/UL (ref 15–500)
EOSINOPHIL NFR BLD AUTO: 4.9 %
ERYTHROCYTE [DISTWIDTH] IN BLOOD BY AUTOMATED COUNT: 12.7 % (ref 11–15)
GLUCOSE SERPL-MCNC: 88 MG/DL (ref 65–99)
HCT VFR BLD AUTO: 51 % (ref 38.5–50)
HDLC SERPL-MCNC: 42 MG/DL
HGB BLD-MCNC: 17 G/DL (ref 13.2–17.1)
LDLC SERPL CALC-MCNC: 167 MG/DL (CALC)
LYMPHOCYTES # BLD AUTO: 2534 CELLS/UL (ref 850–3900)
LYMPHOCYTES NFR BLD AUTO: 30.9 %
MCH RBC QN AUTO: 31.1 PG (ref 27–33)
MCHC RBC AUTO-ENTMCNC: 33.3 G/DL (ref 32–36)
MCV RBC AUTO: 93.2 FL (ref 80–100)
MONOCYTES # BLD AUTO: 705 CELLS/UL (ref 200–950)
MONOCYTES NFR BLD AUTO: 8.6 %
NEUTROPHILS # BLD AUTO: 4502 CELLS/UL (ref 1500–7800)
NEUTROPHILS NFR BLD AUTO: 54.9 %
NONHDLC SERPL-MCNC: 196 MG/DL (CALC)
PLATELET # BLD AUTO: 222 THOUSAND/UL (ref 140–400)
PMV BLD REES-ECKER: 11.2 FL (ref 7.5–12.5)
POTASSIUM SERPL-SCNC: 4.4 MMOL/L (ref 3.5–5.3)
PROT SERPL-MCNC: 7 G/DL (ref 6.1–8.1)
PSA SERPL-MCNC: 4.45 NG/ML
RBC # BLD AUTO: 5.47 MILLION/UL (ref 4.2–5.8)
SODIUM SERPL-SCNC: 138 MMOL/L (ref 135–146)
TRIGL SERPL-MCNC: 149 MG/DL
WBC # BLD AUTO: 8.2 THOUSAND/UL (ref 3.8–10.8)

## 2025-02-27 ENCOUNTER — APPOINTMENT (OUTPATIENT)
Dept: PRIMARY CARE | Facility: CLINIC | Age: 65
End: 2025-02-27
Payer: COMMERCIAL

## 2025-02-27 VITALS
SYSTOLIC BLOOD PRESSURE: 118 MMHG | DIASTOLIC BLOOD PRESSURE: 68 MMHG | TEMPERATURE: 97.3 F | OXYGEN SATURATION: 95 % | HEART RATE: 85 BPM | BODY MASS INDEX: 41.93 KG/M2 | WEIGHT: 315 LBS

## 2025-02-27 DIAGNOSIS — E78.00 HYPERCHOLESTEROLEMIA: ICD-10-CM

## 2025-02-27 DIAGNOSIS — H91.93 BILATERAL HEARING LOSS, UNSPECIFIED HEARING LOSS TYPE: ICD-10-CM

## 2025-02-27 DIAGNOSIS — M17.12 PRIMARY OSTEOARTHRITIS OF LEFT KNEE: ICD-10-CM

## 2025-02-27 DIAGNOSIS — R97.20 PSA ELEVATION: ICD-10-CM

## 2025-02-27 DIAGNOSIS — M19.90 ARTHRITIS: ICD-10-CM

## 2025-02-27 DIAGNOSIS — E66.813 CLASS 3 SEVERE OBESITY DUE TO EXCESS CALORIES WITHOUT SERIOUS COMORBIDITY WITH BODY MASS INDEX (BMI) OF 40.0 TO 44.9 IN ADULT: ICD-10-CM

## 2025-02-27 DIAGNOSIS — M48.062 SPINAL STENOSIS OF LUMBAR REGION WITH NEUROGENIC CLAUDICATION: ICD-10-CM

## 2025-02-27 DIAGNOSIS — G62.89 OTHER POLYNEUROPATHY: ICD-10-CM

## 2025-02-27 DIAGNOSIS — K21.9 CHRONIC GERD: ICD-10-CM

## 2025-02-27 DIAGNOSIS — I10 ESSENTIAL HYPERTENSION: Primary | ICD-10-CM

## 2025-02-27 DIAGNOSIS — E66.01 CLASS 3 SEVERE OBESITY DUE TO EXCESS CALORIES WITHOUT SERIOUS COMORBIDITY WITH BODY MASS INDEX (BMI) OF 40.0 TO 44.9 IN ADULT: ICD-10-CM

## 2025-02-27 DIAGNOSIS — K86.89 SECONDARY PANCREATIC INSUFFICIENCY (HHS-HCC): ICD-10-CM

## 2025-02-27 DIAGNOSIS — H60.501 ACUTE OTITIS EXTERNA OF RIGHT EAR, UNSPECIFIED TYPE: ICD-10-CM

## 2025-02-27 PROCEDURE — 99214 OFFICE O/P EST MOD 30 MIN: CPT | Performed by: INTERNAL MEDICINE

## 2025-02-27 PROCEDURE — 3074F SYST BP LT 130 MM HG: CPT | Performed by: INTERNAL MEDICINE

## 2025-02-27 PROCEDURE — 3078F DIAST BP <80 MM HG: CPT | Performed by: INTERNAL MEDICINE

## 2025-02-27 PROCEDURE — G2211 COMPLEX E/M VISIT ADD ON: HCPCS | Performed by: INTERNAL MEDICINE

## 2025-02-27 RX ORDER — GABAPENTIN 400 MG/1
400 CAPSULE ORAL 2 TIMES DAILY
Qty: 180 CAPSULE | Refills: 1 | Status: SHIPPED | OUTPATIENT
Start: 2025-02-27

## 2025-02-27 RX ORDER — ROSUVASTATIN CALCIUM 5 MG/1
5 TABLET, COATED ORAL DAILY
Qty: 90 TABLET | Refills: 1 | Status: SHIPPED | OUTPATIENT
Start: 2025-02-27

## 2025-02-27 RX ORDER — MELOXICAM 15 MG/1
15 TABLET ORAL DAILY
Qty: 90 TABLET | Refills: 1 | Status: SHIPPED | OUTPATIENT
Start: 2025-02-27

## 2025-02-27 RX ORDER — NEOMYCIN SULFATE, POLYMYXIN B SULFATE, HYDROCORTISONE 3.5; 10000; 1 MG/ML; [USP'U]/ML; MG/ML
2 SOLUTION/ DROPS AURICULAR (OTIC) 4 TIMES DAILY
Qty: 10 ML | Refills: 0 | Status: SHIPPED | OUTPATIENT
Start: 2025-02-27 | End: 2025-03-06

## 2025-02-27 RX ORDER — PANCRELIPASE 36000; 180000; 114000 [USP'U]/1; [USP'U]/1; [USP'U]/1
2 CAPSULE, DELAYED RELEASE PELLETS ORAL
Qty: 540 CAPSULE | Refills: 1 | Status: SHIPPED | OUTPATIENT
Start: 2025-02-27

## 2025-02-27 RX ORDER — VERAPAMIL HYDROCHLORIDE 180 MG/1
180 TABLET, EXTENDED RELEASE ORAL DAILY
Qty: 90 TABLET | Refills: 1 | Status: SHIPPED | OUTPATIENT
Start: 2025-02-27

## 2025-02-27 ASSESSMENT — ENCOUNTER SYMPTOMS
PSYCHIATRIC NEGATIVE: 1
GASTROINTESTINAL NEGATIVE: 1
ENDOCRINE NEGATIVE: 1
NEUROLOGICAL NEGATIVE: 1
HEMATOLOGIC/LYMPHATIC NEGATIVE: 1
MUSCULOSKELETAL NEGATIVE: 1
ALLERGIC/IMMUNOLOGIC NEGATIVE: 1
CONSTITUTIONAL NEGATIVE: 1
CARDIOVASCULAR NEGATIVE: 1
EYES NEGATIVE: 1
RESPIRATORY NEGATIVE: 1

## 2025-02-27 ASSESSMENT — PATIENT HEALTH QUESTIONNAIRE - PHQ9
2. FEELING DOWN, DEPRESSED OR HOPELESS: NOT AT ALL
1. LITTLE INTEREST OR PLEASURE IN DOING THINGS: NOT AT ALL
SUM OF ALL RESPONSES TO PHQ9 QUESTIONS 1 AND 2: 0

## 2025-02-27 NOTE — PROGRESS NOTES
Subjective   Patient ID: Steven Burks is a 64 y.o. male who presents for 6 month follow up.  Patient presents in follow up regarding hypertension.  Blood pressure has been well controlled on current therapy.  No adverse effects of medication reported.  Patient denies chest pain, palpitations, shortness of breath, orthopnea, fatigue or edema.    Pancreatic insufficiency Sx are well compensated on current therapy without side effects other than he complains about the cost of the therapy.    He is currently not taking the medications for his anxiety due to side effects and states he has felt better since stopping these as well as stopping the use of cannabis.  He states that he is retiring from work and this has relieved much of his stress and Sx.        Review of Systems   Constitutional: Negative.    HENT: Negative.     Eyes: Negative.    Respiratory: Negative.     Cardiovascular: Negative.    Gastrointestinal: Negative.    Endocrine: Negative.    Genitourinary: Negative.    Musculoskeletal: Negative.    Skin: Negative.    Allergic/Immunologic: Negative.    Neurological: Negative.    Hematological: Negative.    Psychiatric/Behavioral: Negative.         Objective     Blood pressure 118/68, pulse 85, temperature 36.3 °C (97.3 °F), temperature source Temporal, weight 148 kg (326 lb 9.6 oz), SpO2 95%.   Physical Exam  Constitutional:       Appearance: Normal appearance.   Neck:      Vascular: No carotid bruit.   Cardiovascular:      Rate and Rhythm: Normal rate and regular rhythm.      Pulses: Normal pulses.      Heart sounds: Normal heart sounds. No murmur heard.  Pulmonary:      Effort: Pulmonary effort is normal.      Breath sounds: Normal breath sounds. No wheezing or rales.   Abdominal:      General: Abdomen is flat. There is no distension.      Palpations: Abdomen is soft.      Tenderness: There is no abdominal tenderness.   Musculoskeletal:         General: Normal range of motion.      Cervical back: Normal range  of motion and neck supple.   Skin:     General: Skin is warm and dry.   Neurological:      General: No focal deficit present.      Mental Status: He is alert and oriented to person, place, and time.   Psychiatric:         Mood and Affect: Mood normal.         Behavior: Behavior normal.         Assessment/Plan   Problem List Items Addressed This Visit       Arthritis    Relevant Medications    meloxicam (Mobic) 15 mg tablet    Chronic GERD    Essential hypertension - Primary    Relevant Medications    verapamil SR (Calan-SR) 180 mg ER tablet    Other Relevant Orders    Comprehensive Metabolic Panel    Peripheral neuropathy    Relevant Medications    gabapentin (Neurontin) 400 mg capsule    Secondary pancreatic insufficiency ([facility])    Relevant Medications    pancrelipase, Lip-Prot-Amyl, (Creon) 36,000-114,000- 180,000 unit capsule,delayed release(DR/EC) capsule    Class 3 severe obesity due to excess calories without serious comorbidity with body mass index (BMI) of 40.0 to 44.9 in adult    PSA elevation    Relevant Orders    Prostate Specific Antigen    Spinal stenosis of lumbar region with neurogenic claudication    Hypercholesterolemia    Relevant Medications    rosuvastatin (Crestor) 5 mg tablet    Other Relevant Orders    Lipid Panel     Other Visit Diagnoses       Primary osteoarthritis of left knee        Bilateral hearing loss, unspecified hearing loss type        Acute otitis externa of right ear, unspecified type        Relevant Medications    neomycin-polymyxin-HC (Cortisporin) otic solution            BP well controlled on current therapy.  Will continue present therapy and follow.  Pancreatic insufficiency Sx well compensated on current therapy.  Will continue present therapy and follow.  I do note a significant rise in LDL on most recent lab which was also noted 6 months ago.  Diet versus medication was discussed at that time and he wished to avoid medication at that time.  Medication therapy is  recommended to him at this time and Rx will be submitted for Crestor at low dose and will follow.  Arthritis Sx overall well compensated on current therapy though he states he requires additional ibuprofen especially since he stopped cannabis use.  Will continue present therapy and follow.   GERD Sx well compensated on current therapy.  Will continue present therapy and follow clinically.   Neuropathy Sx overall well compensated on current therapy.  Will continue present therapy and follow.  He states that he has retired and is feeling much better overall with less stress and anxiety.  It is noted that he had gained significant weight last year, but he had been able to lose 21# over the 6 months prior to last visit and has remained stable since then.  He is encouraged.    Pt. advised on improving dietary choices and portion control as well as increasing exercise to promote weight loss.   He saw Ortho re:  left medial knee pain due to moderate osteoarthritis.  He states the steroid injection was helpful.  It does affect ambulation, he states.    Urology note reviewed in re:  rise in PSA which has declined.  No biopsies done.  Will follow PSA and he will see Urology prn.  In re:  previous complaint of tinnitus, he saw ENT and was treated for otitis externa as well as Eustachian tube dysfunction.  He subsequently did have Audiology exam which showed bilateral hearing loss, but he states he can not afford hearing aids.  He also has new c/o squeaking and exudate of the right ear.  Exam confirms exudate and inflammation consistent with otitis externa.  Will treat with Cortisporin Otic.  Final issue is that he states if he walks any distance, he develops numbness in both legs due to spinal claudication.  For this reason, he requests disability parking which I believe he does qualify for.      Follow up in 3 months  Lab to be drawn 1 week prior to next office visit.

## 2025-03-17 ENCOUNTER — TELEPHONE (OUTPATIENT)
Dept: PRIMARY CARE | Facility: CLINIC | Age: 65
End: 2025-03-17
Payer: COMMERCIAL

## 2025-03-21 ENCOUNTER — OFFICE VISIT (OUTPATIENT)
Dept: ORTHOPEDIC SURGERY | Age: 65
End: 2025-03-21
Payer: COMMERCIAL

## 2025-03-21 VITALS — BODY MASS INDEX: 40.43 KG/M2 | WEIGHT: 315 LBS | HEIGHT: 74 IN

## 2025-03-21 DIAGNOSIS — M17.12 PRIMARY LOCALIZED OSTEOARTHRITIS OF LEFT KNEE: Primary | ICD-10-CM

## 2025-03-21 RX ADMIN — LIDOCAINE HYDROCHLORIDE 5 ML: 10 INJECTION, SOLUTION INFILTRATION; PERINEURAL at 15:19

## 2025-03-21 RX ADMIN — TRIAMCINOLONE ACETONIDE 40 MG: 40 INJECTION, SUSPENSION INTRA-ARTICULAR; INTRAMUSCULAR at 15:19

## 2025-03-22 RX ORDER — TRIAMCINOLONE ACETONIDE 40 MG/ML
40 INJECTION, SUSPENSION INTRA-ARTICULAR; INTRAMUSCULAR
Status: COMPLETED | OUTPATIENT
Start: 2025-03-21 | End: 2025-03-21

## 2025-03-22 RX ORDER — LIDOCAINE HYDROCHLORIDE 10 MG/ML
5 INJECTION, SOLUTION INFILTRATION; PERINEURAL
Status: COMPLETED | OUTPATIENT
Start: 2025-03-21 | End: 2025-03-21

## 2025-03-22 NOTE — PROGRESS NOTES
Patient has been seen in the past with left knee pain that responded to a Kenalog injection.  That lasted most of the last year his pain is returning and he would like to consider another injection.  No other significant changes with the knee no recent injuries.    EXAM:    GENERAL: A/Ox3, NAD. Appears healthy, well nourished  SKIN: no erythema, rashes, or ecchymoses     MUSCULOSKELETAL:  Laterality: Left knee Exam  - Alignment: partially correctible varus deformity  - ROM: Full with mild crepitus and pain  - Effusion: none  - Strength: knee extension and flexion 5/5, EHL/PF/DF motor intact  - Palpation: TTP mostly along medial joint line  - Stability: Anterior/Posterior stable, varus/valgus stable. Mild pseudo valgus instability  - Gait: mild antalgic  - Hip Exam: flexion to 100+ degrees, full extension, internal/external rotation adequate, and no pain with log roll  - Special Tests: none performed    NEUROVASCULAR:  - Neurovascular Status: sensation intact to light touch distally  - Capillary refill brisk at extremities, Bilateral dorsalis pedis pulse 2+    RADIOGRAPHS reviewed recent radiographs 3 weeks ago left knee shows ongoing moderate osteoarthritis    ASSESSMENT osteoarthritis left knee    PLAN patient tolerated the Kenalog injection today and will continue home range of motion strengthening program.  Follow-up if no improvement  L Inj/Asp: L knee on 3/21/2025 3:19 PM  Indications: pain  Details: 22 G needle, anterolateral approach  Medications: 5 mL lidocaine 10 mg/mL (1 %); 40 mg triamcinolone acetonide 40 mg/mL  Outcome: tolerated well, no immediate complications  Procedure, treatment alternatives, risks and benefits explained, specific risks discussed. Consent was given by the patient. Immediately prior to procedure a time out was called to verify the correct patient, procedure, equipment, support staff and site/side marked as required. Patient was prepped and draped in the usual sterile fashion.            This note was dictated using speech recognition software and was not corrected for spelling or grammatical errors

## 2025-07-27 DIAGNOSIS — E78.00 HYPERCHOLESTEROLEMIA: ICD-10-CM

## 2025-07-27 DIAGNOSIS — R97.20 PSA ELEVATION: ICD-10-CM

## 2025-07-27 DIAGNOSIS — I10 ESSENTIAL HYPERTENSION: ICD-10-CM

## 2025-08-23 LAB
ALBUMIN SERPL-MCNC: 4.1 G/DL (ref 3.6–5.1)
ALP SERPL-CCNC: 53 U/L (ref 35–144)
ALT SERPL-CCNC: 13 U/L (ref 9–46)
ANION GAP SERPL CALCULATED.4IONS-SCNC: 9 MMOL/L (CALC) (ref 7–17)
AST SERPL-CCNC: 16 U/L (ref 10–35)
BILIRUB SERPL-MCNC: 0.6 MG/DL (ref 0.2–1.2)
BUN SERPL-MCNC: 16 MG/DL (ref 7–25)
CALCIUM SERPL-MCNC: 9.2 MG/DL (ref 8.6–10.3)
CHLORIDE SERPL-SCNC: 105 MMOL/L (ref 98–110)
CHOLEST SERPL-MCNC: 157 MG/DL
CHOLEST/HDLC SERPL: 3.9 (CALC)
CO2 SERPL-SCNC: 24 MMOL/L (ref 20–32)
CREAT SERPL-MCNC: 0.93 MG/DL (ref 0.7–1.35)
EGFRCR SERPLBLD CKD-EPI 2021: 92 ML/MIN/1.73M2
GLUCOSE SERPL-MCNC: 88 MG/DL (ref 65–99)
HDLC SERPL-MCNC: 40 MG/DL
LDLC SERPL CALC-MCNC: 96 MG/DL (CALC)
NONHDLC SERPL-MCNC: 117 MG/DL (CALC)
POTASSIUM SERPL-SCNC: 4.5 MMOL/L (ref 3.5–5.3)
PROT SERPL-MCNC: 6.8 G/DL (ref 6.1–8.1)
PSA SERPL-MCNC: 7.63 NG/ML
SODIUM SERPL-SCNC: 138 MMOL/L (ref 135–146)
TRIGL SERPL-MCNC: 118 MG/DL

## 2025-08-27 ENCOUNTER — APPOINTMENT (OUTPATIENT)
Dept: PRIMARY CARE | Facility: CLINIC | Age: 65
End: 2025-08-27
Payer: COMMERCIAL

## 2025-08-27 ENCOUNTER — HOSPITAL ENCOUNTER (OUTPATIENT)
Dept: RADIOLOGY | Facility: CLINIC | Age: 65
Discharge: HOME | End: 2025-08-27
Payer: COMMERCIAL

## 2025-08-27 VITALS
WEIGHT: 304 LBS | HEART RATE: 85 BPM | DIASTOLIC BLOOD PRESSURE: 62 MMHG | TEMPERATURE: 98 F | SYSTOLIC BLOOD PRESSURE: 110 MMHG | BODY MASS INDEX: 39.03 KG/M2 | OXYGEN SATURATION: 94 %

## 2025-08-27 DIAGNOSIS — I10 ESSENTIAL HYPERTENSION: Primary | ICD-10-CM

## 2025-08-27 DIAGNOSIS — M48.062 SPINAL STENOSIS OF LUMBAR REGION WITH NEUROGENIC CLAUDICATION: ICD-10-CM

## 2025-08-27 DIAGNOSIS — E66.01 CLASS 2 SEVERE OBESITY DUE TO EXCESS CALORIES WITH SERIOUS COMORBIDITY AND BODY MASS INDEX (BMI) OF 39.0 TO 39.9 IN ADULT: ICD-10-CM

## 2025-08-27 DIAGNOSIS — E66.812 CLASS 2 SEVERE OBESITY DUE TO EXCESS CALORIES WITH SERIOUS COMORBIDITY AND BODY MASS INDEX (BMI) OF 39.0 TO 39.9 IN ADULT: ICD-10-CM

## 2025-08-27 DIAGNOSIS — G62.89 OTHER POLYNEUROPATHY: ICD-10-CM

## 2025-08-27 DIAGNOSIS — M19.90 ARTHRITIS: ICD-10-CM

## 2025-08-27 DIAGNOSIS — R97.20 PSA ELEVATION: ICD-10-CM

## 2025-08-27 DIAGNOSIS — E78.00 HYPERCHOLESTEROLEMIA: ICD-10-CM

## 2025-08-27 DIAGNOSIS — M54.2 CERVICALGIA: ICD-10-CM

## 2025-08-27 DIAGNOSIS — K86.89 SECONDARY PANCREATIC INSUFFICIENCY (HHS-HCC): ICD-10-CM

## 2025-08-27 DIAGNOSIS — H91.93 BILATERAL HEARING LOSS, UNSPECIFIED HEARING LOSS TYPE: ICD-10-CM

## 2025-08-27 PROCEDURE — 3074F SYST BP LT 130 MM HG: CPT | Performed by: INTERNAL MEDICINE

## 2025-08-27 PROCEDURE — 3078F DIAST BP <80 MM HG: CPT | Performed by: INTERNAL MEDICINE

## 2025-08-27 PROCEDURE — 99214 OFFICE O/P EST MOD 30 MIN: CPT | Performed by: INTERNAL MEDICINE

## 2025-08-27 PROCEDURE — 72050 X-RAY EXAM NECK SPINE 4/5VWS: CPT

## 2025-08-27 PROCEDURE — 72050 X-RAY EXAM NECK SPINE 4/5VWS: CPT | Performed by: RADIOLOGY

## 2025-08-27 RX ORDER — MELOXICAM 15 MG/1
15 TABLET ORAL DAILY
Qty: 90 TABLET | Refills: 1 | Status: CANCELLED | OUTPATIENT
Start: 2025-08-27

## 2025-08-27 RX ORDER — PANCRELIPASE 36000; 180000; 114000 [USP'U]/1; [USP'U]/1; [USP'U]/1
2 CAPSULE, DELAYED RELEASE PELLETS ORAL
Qty: 540 CAPSULE | Refills: 1 | Status: SHIPPED | OUTPATIENT
Start: 2025-08-27

## 2025-08-27 RX ORDER — CELECOXIB 200 MG/1
200 CAPSULE ORAL DAILY
Qty: 90 CAPSULE | Refills: 1 | Status: SHIPPED | OUTPATIENT
Start: 2025-08-27

## 2025-08-27 RX ORDER — VERAPAMIL HYDROCHLORIDE 180 MG/1
180 TABLET, EXTENDED RELEASE ORAL DAILY
Qty: 90 TABLET | Refills: 1 | Status: SHIPPED | OUTPATIENT
Start: 2025-08-27

## 2025-08-27 RX ORDER — ROSUVASTATIN CALCIUM 5 MG/1
5 TABLET, COATED ORAL DAILY
Qty: 90 TABLET | Refills: 1 | Status: SHIPPED | OUTPATIENT
Start: 2025-08-27

## 2025-08-27 RX ORDER — GABAPENTIN 400 MG/1
400 CAPSULE ORAL 2 TIMES DAILY
Qty: 180 CAPSULE | Refills: 1 | Status: SHIPPED | OUTPATIENT
Start: 2025-08-27

## 2025-08-27 ASSESSMENT — ENCOUNTER SYMPTOMS
CARDIOVASCULAR NEGATIVE: 1
MUSCULOSKELETAL NEGATIVE: 1
GASTROINTESTINAL NEGATIVE: 1
RESPIRATORY NEGATIVE: 1
ENDOCRINE NEGATIVE: 1
HEMATOLOGIC/LYMPHATIC NEGATIVE: 1
CONSTITUTIONAL NEGATIVE: 1
NEUROLOGICAL NEGATIVE: 1
ALLERGIC/IMMUNOLOGIC NEGATIVE: 1
PSYCHIATRIC NEGATIVE: 1
EYES NEGATIVE: 1

## 2025-08-27 ASSESSMENT — PATIENT HEALTH QUESTIONNAIRE - PHQ9
SUM OF ALL RESPONSES TO PHQ9 QUESTIONS 1 AND 2: 1
2. FEELING DOWN, DEPRESSED OR HOPELESS: NOT AT ALL
1. LITTLE INTEREST OR PLEASURE IN DOING THINGS: SEVERAL DAYS

## 2026-02-27 ENCOUNTER — APPOINTMENT (OUTPATIENT)
Dept: PRIMARY CARE | Facility: CLINIC | Age: 66
End: 2026-02-27
Payer: COMMERCIAL